# Patient Record
Sex: MALE | Race: BLACK OR AFRICAN AMERICAN | NOT HISPANIC OR LATINO
[De-identification: names, ages, dates, MRNs, and addresses within clinical notes are randomized per-mention and may not be internally consistent; named-entity substitution may affect disease eponyms.]

---

## 2019-05-16 ENCOUNTER — APPOINTMENT (OUTPATIENT)
Dept: PULMONOLOGY | Facility: CLINIC | Age: 34
End: 2019-05-16
Payer: COMMERCIAL

## 2019-05-16 ENCOUNTER — LABORATORY RESULT (OUTPATIENT)
Age: 34
End: 2019-05-16

## 2019-05-16 VITALS
SYSTOLIC BLOOD PRESSURE: 127 MMHG | HEART RATE: 74 BPM | BODY MASS INDEX: 25.83 KG/M2 | TEMPERATURE: 98 F | DIASTOLIC BLOOD PRESSURE: 79 MMHG | HEIGHT: 65 IN | WEIGHT: 155 LBS | OXYGEN SATURATION: 96 % | RESPIRATION RATE: 16 BRPM

## 2019-05-16 DIAGNOSIS — Z83.2 FAMILY HISTORY OF DISEASES OF THE BLOOD AND BLOOD-FORMING ORGANS AND CERTAIN DISORDERS INVOLVING THE IMMUNE MECHANISM: ICD-10-CM

## 2019-05-16 DIAGNOSIS — Z87.891 PERSONAL HISTORY OF NICOTINE DEPENDENCE: ICD-10-CM

## 2019-05-16 PROCEDURE — 94726 PLETHYSMOGRAPHY LUNG VOLUMES: CPT

## 2019-05-16 PROCEDURE — 99245 OFF/OP CONSLTJ NEW/EST HI 55: CPT | Mod: 25

## 2019-05-16 PROCEDURE — 94729 DIFFUSING CAPACITY: CPT

## 2019-05-16 PROCEDURE — 36415 COLL VENOUS BLD VENIPUNCTURE: CPT

## 2019-05-16 PROCEDURE — ZZZZZ: CPT

## 2019-05-16 PROCEDURE — 94060 EVALUATION OF WHEEZING: CPT

## 2019-05-16 RX ORDER — ALBUTEROL SULFATE 90 UG/1
108 (90 BASE) AEROSOL, METERED RESPIRATORY (INHALATION)
Qty: 1 | Refills: 1 | Status: ACTIVE | COMMUNITY
Start: 2019-05-16 | End: 1900-01-01

## 2019-05-16 NOTE — HISTORY OF PRESENT ILLNESS
[FreeTextEntry1] : This letter  is regarding your patient  who  attended pulmonary out patient office today.  I have reviewed  patient's  past history, social history, family history and medication list. I also  reviewed nurse practitioners/ and fellows  notes and assessment and agree with it.  \par The patient was referred by Dr.Aliama Okeefe for sarcoidosis (previously diagnosed on CT-- no biopsy) . PMH Gray's Palsy. Former cigarette smoker approx age 20-21. 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 Pt c/o exertional dyspnea\par \par ------No history of , fever, chills , rigors, chest pain, or hemoptysis. Questionable history of Raynaud's phenomenon. No h/o significant weight loss in last few months. No history of liver dysfunction , collagen vascular disorder or chronic thromboembolic disease. I would classify the patient's dyspnea as WHO  FUNCTIONAL CLASS II--------\par \par ----Echo  date--not done----\par ----Pft date---5/2019 mild obstruction w/minimal bronchodilator response, decreased dlco------\par ----Ct scan date---not done (report normal CXR)----\par ----Cath date------not done------\par \par

## 2019-05-16 NOTE — PHYSICAL EXAM
[General Appearance - Well Developed] : well developed [Normal Appearance] : normal appearance [Well Groomed] : well groomed [General Appearance - Well Nourished] : well nourished [No Deformities] : no deformities [General Appearance - In No Acute Distress] : no acute distress [Normal Conjunctiva] : the conjunctiva exhibited no abnormalities [Eyelids - No Xanthelasma] : the eyelids demonstrated no xanthelasmas [III] : III [Neck Cervical Mass (___cm)] : no neck mass was observed [Neck Appearance] : the appearance of the neck was normal [Jugular Venous Distention Increased] : there was no jugular-venous distention [Thyroid Nodule] : there were no palpable thyroid nodules [Thyroid Diffuse Enlargement] : the thyroid was not enlarged [Heart Rate And Rhythm] : heart rate and rhythm were normal [Murmurs] : no murmurs present [Heart Sounds] : normal S1 and S2 [Auscultation Breath Sounds / Voice Sounds] : lungs were clear to auscultation bilaterally [Exaggerated Use Of Accessory Muscles For Inspiration] : no accessory muscle use [Respiration, Rhythm And Depth] : normal respiratory rhythm and effort [Tenderness: ____] : tenderness [unfilled] [Abdomen Soft] : soft [Abdomen Tenderness] : non-tender [Abdomen Mass (___ Cm)] : no abdominal mass palpated [Abnormal Walk] : normal gait [Nail Clubbing] : no clubbing of the fingernails [Gait - Sufficient For Exercise Testing] : the gait was sufficient for exercise testing [Petechial Hemorrhages (___cm)] : no petechial hemorrhages [Cyanosis, Localized] : no localized cyanosis [Skin Color & Pigmentation] : normal skin color and pigmentation [] : no rash [Skin Turgor] : normal skin turgor [Sensation] : the sensory exam was normal to light touch and pinprick [Deep Tendon Reflexes (DTR)] : deep tendon reflexes were 2+ and symmetric [No Focal Deficits] : no focal deficits [Oriented To Time, Place, And Person] : oriented to person, place, and time [Impaired Insight] : insight and judgment were intact [Affect] : the affect was normal

## 2019-05-16 NOTE — DISCUSSION/SUMMARY
[FreeTextEntry1] : ---Assessment plan----------The patient has been referred here for further opinion regarding pulmonary problem, 34 yo w/sarcoidosis , PAST H/O RETINITIS, ASLO   BELLS  PALSY { ?NEURO SARCOIDOSIS] \par 1) get CT chest noncontrast\par 2) CPET\par 3) labs drawn today for sarcoid w/u\par 4) echo \par 5) ventolin as needed \par 6) has features of WILFREDO- consider PSG (will discuss at next visit)\par 7) yearly eye exam\par 8) reinforced need for yearly PFT\par 9) f/u in 8 weeks\par \par \par Thanks for allowing  me to participate  in the care of this patient.  Patient at this time  will follow  the above mentioned recommendations and return back for follow up visit. If you have any questions  I can be reached  at #[phone] (beeper)  or  [phone] (office).\par \par Lilo Sanchez MD, FCCP \par Director, Pulmonary Hypertension Program \par [facility] \par Division of Pulmonary, Critical Care and Sleep Medicine \par  Professor of Medicine \par [employer]\par

## 2019-05-17 LAB
A1AT SERPL-MCNC: 119 MG/DL
ALBUMIN SERPL ELPH-MCNC: 4.5 G/DL
ALP BLD-CCNC: 73 U/L
ALT SERPL-CCNC: 21 U/L
ANION GAP SERPL CALC-SCNC: 15 MMOL/L
AST SERPL-CCNC: 37 U/L
BASOPHILS # BLD AUTO: 0.03 K/UL
BASOPHILS NFR BLD AUTO: 0.9 %
BILIRUB SERPL-MCNC: 0.8 MG/DL
BUN SERPL-MCNC: 12 MG/DL
CALCIUM SERPL-MCNC: 9.8 MG/DL
CALCIUM SERPL-MCNC: 9.8 MG/DL
CARDIOLIPIN AB SER IA-ACNC: NEGATIVE
CHLORIDE SERPL-SCNC: 101 MMOL/L
CO2 SERPL-SCNC: 23 MMOL/L
CREAT SERPL-MCNC: 0.96 MG/DL
DEPRECATED KAPPA LC FREE/LAMBDA SER: 0.94 RATIO
EOSINOPHIL # BLD AUTO: 0.1 K/UL
EOSINOPHIL NFR BLD AUTO: 3 %
ERYTHROCYTE [SEDIMENTATION RATE] IN BLOOD BY WESTERGREN METHOD: 32 MM/HR
FOLATE RBC-MCNC: 1265 NG/ML
GLUCOSE SERPL-MCNC: 93 MG/DL
HCT VFR BLD CALC: 47.9 %
HCT VFR BLD CALC: 48.2 %
HCYS SERPL-MCNC: 10.6 UMOL/L
HGB BLD-MCNC: 14.9 G/DL
IGA SER QL IEP: 202 MG/DL
IGG SER QL IEP: 2399 MG/DL
IGM SER QL IEP: 85 MG/DL
IMM GRANULOCYTES NFR BLD AUTO: 0.3 %
INR PPP: 1.2 RATIO
KAPPA LC CSF-MCNC: 1.86 MG/DL
KAPPA LC SERPL-MCNC: 1.75 MG/DL
LYMPHOCYTES # BLD AUTO: 0.67 K/UL
LYMPHOCYTES NFR BLD AUTO: 20.4 %
MAN DIFF?: NORMAL
MCHC RBC-ENTMCNC: 25.8 PG
MCHC RBC-ENTMCNC: 30.9 GM/DL
MCV RBC AUTO: 83.4 FL
MONOCYTES # BLD AUTO: 0.53 K/UL
MONOCYTES NFR BLD AUTO: 16.2 %
NEUTROPHILS # BLD AUTO: 1.94 K/UL
NEUTROPHILS NFR BLD AUTO: 59.2 %
NT-PROBNP SERPL-MCNC: 13 PG/ML
PARATHYROID HORMONE INTACT: 35 PG/ML
PHOSPHATE SERPL-MCNC: 4.2 MG/DL
PLATELET # BLD AUTO: 354 K/UL
POTASSIUM SERPL-SCNC: 4.8 MMOL/L
PROT SERPL-MCNC: 8.5 G/DL
PT BLD: 13.8 SEC
RBC # BLD: 5.78 M/UL
RBC # FLD: 13.8 %
RHEUMATOID FACT SER QL: <10 IU/ML
SODIUM SERPL-SCNC: 139 MMOL/L
T3RU NFR SERPL: 1 TBI
TSH SERPL-ACNC: 0.77 UIU/ML
URATE SERPL-MCNC: 6.5 MG/DL
VIT B12 SERPL-MCNC: 611 PG/ML
WBC # FLD AUTO: 3.28 K/UL

## 2019-05-20 ENCOUNTER — MEDICATION RENEWAL (OUTPATIENT)
Age: 34
End: 2019-05-20

## 2019-05-20 LAB
25(OH)D3 SERPL-MCNC: 6.8 NG/ML
ANA PAT FLD IF-IMP: ABNORMAL
ANACR T: ABNORMAL
ENA SCL70 IGG SER IA-ACNC: <0.2 AL
ENA SS-A AB SER IA-ACNC: <0.2 AL
ENA SS-B AB SER IA-ACNC: <0.2 AL
TOTAL IGE SMQN RAST: 6 KU/L

## 2019-05-21 LAB
ACE BLD-CCNC: 174 U/L
CCP AB SER IA-ACNC: 14 UNITS
RF+CCP IGG SER-IMP: NEGATIVE

## 2019-05-29 ENCOUNTER — MOBILE ON CALL (OUTPATIENT)
Age: 34
End: 2019-05-29

## 2019-05-29 LAB
ACETAMINOPHE, BLOOD: NEGATIVE UG/ML
AMPHETAMINES, BLD: NEGATIVE NG/ML
BARBITURATES, BLD: NEGATIVE UG/ML
BENZODIAZEPINES, BLD: NEGATIVE NG/ML
BUPRENORPHINE, BLOOD: NEGATIVE NG/ML
CARISOPRODOL, BLOOD: NEGATIVE UG/ML
COCAINE METABOLITE. BLD: NEGATIVE NG/ML
DRUGS ABSENT REPORTED: NORMAL
DRUGS PRESENT NOT REPORTED: NORMAL
DRUGS PRESENT REPORTED: NORMAL
ETHYL ALCOHOL, BLD: NEGATIVE GM/DL
FENTANYL, BLD: NEGATIVE NG/ML
GABAPENTIN BLOOD: NEGATIVE UG/ML
MEPERIDINE,BLOOD: NEGATIVE NG/ML
METHADONE, BLD: NEGATIVE NG/ML
OPIATES, BLOOD: NEGATIVE NG/ML
OTHER DRUGS DETECTED: NORMAL
OXYCODONE, BLOOD: NEGATIVE NG/ML
PHENCYCLIDINE, BLD: NEGATIVE NG/ML
PROPOXYPHENE, BLD: NEGATIVE NG/ML
REPORTED MEDICATIONS: NORMAL
THC (MARIJUANA) METABOLITE BLD: NEGATIVE NG/ML
TRAMADOL BLOOD: NEGATIVE NG/ML

## 2019-06-06 ENCOUNTER — APPOINTMENT (OUTPATIENT)
Dept: CT IMAGING | Facility: CLINIC | Age: 34
End: 2019-06-06
Payer: COMMERCIAL

## 2019-06-06 ENCOUNTER — OUTPATIENT (OUTPATIENT)
Dept: OUTPATIENT SERVICES | Facility: HOSPITAL | Age: 34
LOS: 1 days | End: 2019-06-06
Payer: COMMERCIAL

## 2019-06-06 DIAGNOSIS — D86.9 SARCOIDOSIS, UNSPECIFIED: ICD-10-CM

## 2019-06-06 DIAGNOSIS — Z00.00 ENCOUNTER FOR GENERAL ADULT MEDICAL EXAMINATION WITHOUT ABNORMAL FINDINGS: ICD-10-CM

## 2019-06-06 PROCEDURE — 71250 CT THORAX DX C-: CPT

## 2019-06-06 PROCEDURE — 71250 CT THORAX DX C-: CPT | Mod: 26

## 2019-06-27 ENCOUNTER — OUTPATIENT (OUTPATIENT)
Dept: OUTPATIENT SERVICES | Facility: HOSPITAL | Age: 34
LOS: 1 days | End: 2019-06-27
Payer: COMMERCIAL

## 2019-06-27 ENCOUNTER — APPOINTMENT (OUTPATIENT)
Dept: CV DIAGNOSITCS | Facility: HOSPITAL | Age: 34
End: 2019-06-27

## 2019-06-27 DIAGNOSIS — I25.10 ATHEROSCLEROTIC HEART DISEASE OF NATIVE CORONARY ARTERY WITHOUT ANGINA PECTORIS: ICD-10-CM

## 2019-06-27 PROCEDURE — 93306 TTE W/DOPPLER COMPLETE: CPT | Mod: 26

## 2019-06-27 PROCEDURE — 93306 TTE W/DOPPLER COMPLETE: CPT

## 2019-07-10 NOTE — PHYSICAL EXAM
[General Appearance - Well Developed] : well developed [Normal Appearance] : normal appearance [Well Groomed] : well groomed [No Deformities] : no deformities [General Appearance - Well Nourished] : well nourished [General Appearance - In No Acute Distress] : no acute distress [Eyelids - No Xanthelasma] : the eyelids demonstrated no xanthelasmas [Normal Conjunctiva] : the conjunctiva exhibited no abnormalities [Neck Cervical Mass (___cm)] : no neck mass was observed [III] : III [Neck Appearance] : the appearance of the neck was normal [Jugular Venous Distention Increased] : there was no jugular-venous distention [Thyroid Nodule] : there were no palpable thyroid nodules [Thyroid Diffuse Enlargement] : the thyroid was not enlarged [Heart Sounds] : normal S1 and S2 [Heart Rate And Rhythm] : heart rate and rhythm were normal [Murmurs] : no murmurs present [Auscultation Breath Sounds / Voice Sounds] : lungs were clear to auscultation bilaterally [Exaggerated Use Of Accessory Muscles For Inspiration] : no accessory muscle use [Respiration, Rhythm And Depth] : normal respiratory rhythm and effort [Tenderness: ____] : tenderness [unfilled] [Abdomen Tenderness] : non-tender [Abdomen Soft] : soft [Abnormal Walk] : normal gait [Abdomen Mass (___ Cm)] : no abdominal mass palpated [Gait - Sufficient For Exercise Testing] : the gait was sufficient for exercise testing [Nail Clubbing] : no clubbing of the fingernails [Petechial Hemorrhages (___cm)] : no petechial hemorrhages [Cyanosis, Localized] : no localized cyanosis [Sensation] : the sensory exam was normal to light touch and pinprick [Deep Tendon Reflexes (DTR)] : deep tendon reflexes were 2+ and symmetric [No Focal Deficits] : no focal deficits [Impaired Insight] : insight and judgment were intact [Oriented To Time, Place, And Person] : oriented to person, place, and time [Skin Color & Pigmentation] : normal skin color and pigmentation [Affect] : the affect was normal [Skin Turgor] : normal skin turgor [] : no rash

## 2019-07-11 ENCOUNTER — APPOINTMENT (OUTPATIENT)
Dept: PULMONOLOGY | Facility: CLINIC | Age: 34
End: 2019-07-11
Payer: COMMERCIAL

## 2019-07-11 VITALS
DIASTOLIC BLOOD PRESSURE: 76 MMHG | HEIGHT: 65 IN | OXYGEN SATURATION: 96 % | WEIGHT: 155 LBS | RESPIRATION RATE: 16 BRPM | HEART RATE: 76 BPM | BODY MASS INDEX: 25.83 KG/M2 | TEMPERATURE: 98.4 F | SYSTOLIC BLOOD PRESSURE: 121 MMHG

## 2019-07-11 DIAGNOSIS — Z86.69 PERSONAL HISTORY OF OTHER DISEASES OF THE NERVOUS SYSTEM AND SENSE ORGANS: ICD-10-CM

## 2019-07-11 PROCEDURE — ZZZZZ: CPT

## 2019-07-11 PROCEDURE — 99215 OFFICE O/P EST HI 40 MIN: CPT | Mod: 25

## 2019-07-11 PROCEDURE — 94375 RESPIRATORY FLOW VOLUME LOOP: CPT

## 2019-07-11 PROCEDURE — 94621 CARDIOPULM EXERCISE TESTING: CPT

## 2019-07-11 NOTE — DISCUSSION/SUMMARY
[FreeTextEntry1] : ---Assessment plan----------The patient has been referred here for further opinion regarding pulmonary problem, 34 yo w/sarcoidosis , PAST H/O RETINITIS, ASLO   BELLS  PALSY   { ?NEURO SARCOIDOSIS] \par 1)  CT chest noncontrast - SUGGESTIVE OF SARCOIDOSIS\par 2RDT O DR QUINONEZ FOR OPEN LUNG BIOPSY \par 3) labs drawn today for sarcoid w/u\par 4) echo \par 5)LOW DOSE PREDNISONE  FOR 8 WEEKS [ PT HESITANT ]\par 6) has features of WILFREDO- consider PSG (will discuss at next visit)\par 7) yearly eye exam\par 8) reinforced need for yearly PFT\par 9) f/u in 8 weeks\par \par \par Thanks for allowing  me to participate  in the care of this patient.  Patient at this time  will follow  the above mentioned recommendations and return back for follow up visit. If you have any questions  I can be reached  at #[phone] (beeper)  or  [phone] (office).\par \par Lilo Sanchez MD, FCCP \par Director, Pulmonary Hypertension Program \par [facility] \par Division of Pulmonary, Critical Care and Sleep Medicine \par  Professor of Medicine \par [employer]\par

## 2019-07-11 NOTE — HISTORY OF PRESENT ILLNESS
[FreeTextEntry1] : This letter  is regarding your patient  who  attended pulmonary out patient office today.  I have reviewed  patient's  past history, social history, family history and medication list. I also  reviewed nurse practitioners/ and fellows  notes and assessment and agree with it.  \par The patient was referred by Dr.Aliama Okeefe for sarcoidosis (previously diagnosed on CT-- no biopsy) . PMH Gray's Palsy. Former cigarette smoker approx age [age]. 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 Pt c/o exertional dyspnea\par \par ------No history of , fever, chills , rigors, chest pain, or hemoptysis. Questionable history of Raynaud's phenomenon. No h/o significant weight loss in last few months. No history of liver dysfunction , collagen vascular disorder or chronic thromboembolic disease. I would classify the patient's dyspnea as WHO  FUNCTIONAL CLASS II--------\par \par ----Echo  date--not done----\par ----Pft date---5/2019 mild obstruction w/minimal bronchodilator response, decreased dlco------\par ----Ct scan date---2019--  AIRWAYS AND LUNGS: The central tracheobronchial tree is patent. Extensive \par bilateral perilymphatic nodules and irregular opacities most extensive in \par both upper lobes with associated upper lobe volume loss. Mild bronchiectasis \par is noted throughout the lungs. \par \par MEDIASTINUM AND PLEURA: Subcarinal lymph node measures 2.5 cm. Other smaller \par mediastinal lymph nodes are noted. The visualized portion of the thyroid \par gland is unremarkable. There is no pleural effusion. There is no \par pneumothorax. \par \par HEART AND VESSELS: The heart is normal in size. There are no \par atherosclerotic calcifications of the aorta. There is no pericardial \par effusion. \par \par UPPER ABDOMEN: Images of the upper abdomen demonstrate no abnormality. \par \par BONES AND SOFT TISSUES: The bones are unremarkable. The soft tissues are \par unremarkable. \par \par TUBES/LINES: None. \par \par IMPRESSION: \par Extensive pulmonary sarcoidosis. \par \par \par ----Cath date------not done------\par \par

## 2019-07-12 ENCOUNTER — APPOINTMENT (OUTPATIENT)
Dept: OPHTHALMOLOGY | Facility: CLINIC | Age: 34
End: 2019-07-12

## 2019-07-23 LAB
ALBUMIN SERPL ELPH-MCNC: 4.6 G/DL
ALP BLD-CCNC: 73 U/L
ALT SERPL-CCNC: 15 U/L
ANION GAP SERPL CALC-SCNC: 11 MMOL/L
AST SERPL-CCNC: 26 U/L
BASOPHILS # BLD AUTO: 0.03 K/UL
BASOPHILS NFR BLD AUTO: 0.9 %
BILIRUB SERPL-MCNC: 1 MG/DL
BUN SERPL-MCNC: 10 MG/DL
CALCIUM SERPL-MCNC: 9.9 MG/DL
CHLORIDE SERPL-SCNC: 100 MMOL/L
CO2 SERPL-SCNC: 28 MMOL/L
CREAT SERPL-MCNC: 1.01 MG/DL
EOSINOPHIL # BLD AUTO: 0.12 K/UL
EOSINOPHIL NFR BLD AUTO: 3.6 %
GLUCOSE SERPL-MCNC: 89 MG/DL
HCT VFR BLD CALC: 48.2 %
HGB BLD-MCNC: 15.4 G/DL
IMM GRANULOCYTES NFR BLD AUTO: 0.3 %
LYMPHOCYTES # BLD AUTO: 0.83 K/UL
LYMPHOCYTES NFR BLD AUTO: 24.8 %
MAN DIFF?: NORMAL
MCHC RBC-ENTMCNC: 25.8 PG
MCHC RBC-ENTMCNC: 32 GM/DL
MCV RBC AUTO: 80.9 FL
MONOCYTES # BLD AUTO: 0.72 K/UL
MONOCYTES NFR BLD AUTO: 21.5 %
NEUTROPHILS # BLD AUTO: 1.64 K/UL
NEUTROPHILS NFR BLD AUTO: 48.9 %
NT-PROBNP SERPL-MCNC: 34 PG/ML
PLATELET # BLD AUTO: 332 K/UL
POTASSIUM SERPL-SCNC: 5.3 MMOL/L
PROT SERPL-MCNC: 8.5 G/DL
RBC # BLD: 5.96 M/UL
RBC # FLD: 14 %
SODIUM SERPL-SCNC: 139 MMOL/L
WBC # FLD AUTO: 3.35 K/UL

## 2019-08-13 ENCOUNTER — APPOINTMENT (OUTPATIENT)
Dept: THORACIC SURGERY | Facility: CLINIC | Age: 34
End: 2019-08-13
Payer: COMMERCIAL

## 2019-08-13 VITALS
WEIGHT: 158 LBS | SYSTOLIC BLOOD PRESSURE: 135 MMHG | RESPIRATION RATE: 17 BRPM | DIASTOLIC BLOOD PRESSURE: 81 MMHG | OXYGEN SATURATION: 99 % | HEART RATE: 84 BPM | HEIGHT: 66 IN | BODY MASS INDEX: 25.39 KG/M2

## 2019-08-13 PROCEDURE — 99205 OFFICE O/P NEW HI 60 MIN: CPT

## 2019-08-14 RX ORDER — PREDNISONE 10 MG/1
10 TABLET ORAL DAILY
Qty: 60 | Refills: 1 | Status: DISCONTINUED | COMMUNITY
Start: 2019-07-11 | End: 2019-08-14

## 2019-08-14 NOTE — PHYSICAL EXAM
[General Appearance - In No Acute Distress] : in no acute distress [General Appearance - Alert] : alert [Hearing Threshold Finger Rub Not Izard] : hearing was normal [Outer Ear] : the ears and nose were normal in appearance [Neck Appearance] : the appearance of the neck was normal [Respiration, Rhythm And Depth] : normal respiratory rhythm and effort [] : no respiratory distress [Heart Sounds] : normal S1 and S2 [Auscultation Breath Sounds / Voice Sounds] : lungs were clear to auscultation bilaterally [Examination Of The Chest] : the chest was normal in appearance [Murmurs] : no murmurs [Abdomen Soft] : soft [Abdomen Tenderness] : non-tender [Cervical Lymph Nodes Enlarged Posterior Bilaterally] : posterior cervical [Cervical Lymph Nodes Enlarged Anterior Bilaterally] : anterior cervical [Supraclavicular Lymph Nodes Enlarged Bilaterally] : supraclavicular [Skin Color & Pigmentation] : normal skin color and pigmentation [Abnormal Walk] : normal gait [Oriented To Time, Place, And Person] : oriented to person, place, and time [Skin Turgor] : normal skin turgor [Mood] : the mood was normal [Affect] : the affect was normal

## 2019-08-15 NOTE — HISTORY OF PRESENT ILLNESS
[FreeTextEntry1] : Mr. Lenz is a 33 year old male who presents today for evaluation of subcarinal lymphadenopathy, referred by Dr. Sanchez.  He is a nonsmoker with a history of marijuana use, Bell's Palsy and sarcoid (diagnosed by CT).  He states that he began feeling winded one year ago which prompted pulmonary evaluation.\par \par CT Chest on 6/6/19 revealed:\par - Subcarinal lymph node, 2.5 cm\par - Extensive bilateral perilymphatic nodules and irregular opacities most extensive in bilateral upper lobes\par - Mild bronchiectasis bilaterally\par \par PFTs on 5/16/19 revealed:\par - FEV1: 2.83 (89%)\par - DLCO: 19.1 (59%)\par \par Currently, he denies any fever, chills, cough, shortness of breath, chest pain, hemoptysis, or recent illness.  Of note, he was recommended to take Prednisone by Dr. Sanchez, however, he declined steroids at this time.

## 2019-08-15 NOTE — CONSULT LETTER
[Please see my note below.] : Please see my note below. [Consult Letter:] : I had the pleasure of evaluating your patient, [unfilled]. [Consult Closing:] : Thank you very much for allowing me to participate in the care of this patient.  If you have any questions, please do not hesitate to contact me. [FreeTextEntry2] : Dr. Sanchez (Pulm/ref)\par Dr. Okeefe (PCP)\par  [FreeTextEntry3] : \par \par \par Lennox Jackson MD, FACS \par Chief, Division of Thoracic Surgery \par Director, Minimally Invasive Thoracic Surgery \par Department of Cardiovascular and Thoracic Surgery \par Health system \par , Cardiovascular and Thoracic Surgery

## 2019-08-16 ENCOUNTER — OUTPATIENT (OUTPATIENT)
Dept: OUTPATIENT SERVICES | Facility: HOSPITAL | Age: 34
LOS: 1 days | End: 2019-08-16

## 2019-08-16 VITALS
SYSTOLIC BLOOD PRESSURE: 108 MMHG | HEART RATE: 71 BPM | RESPIRATION RATE: 16 BRPM | TEMPERATURE: 98 F | DIASTOLIC BLOOD PRESSURE: 72 MMHG | WEIGHT: 156.09 LBS | OXYGEN SATURATION: 99 % | HEIGHT: 65.5 IN

## 2019-08-16 DIAGNOSIS — D86.9 SARCOIDOSIS, UNSPECIFIED: ICD-10-CM

## 2019-08-16 LAB
ANION GAP SERPL CALC-SCNC: 16 MMO/L — HIGH (ref 7–14)
ANISOCYTOSIS BLD QL: SLIGHT — SIGNIFICANT CHANGE UP
ANISOCYTOSIS BLD QL: SLIGHT — SIGNIFICANT CHANGE UP
BASOPHILS # BLD AUTO: 0.06 K/UL — SIGNIFICANT CHANGE UP (ref 0–0.2)
BASOPHILS NFR BLD AUTO: 1.6 % — SIGNIFICANT CHANGE UP (ref 0–2)
BASOPHILS NFR SPEC: 1 % — SIGNIFICANT CHANGE UP (ref 0–2)
BASOPHILS NFR SPEC: 1 % — SIGNIFICANT CHANGE UP (ref 0–2)
BLD GP AB SCN SERPL QL: NEGATIVE — SIGNIFICANT CHANGE UP
BUN SERPL-MCNC: 8 MG/DL — SIGNIFICANT CHANGE UP (ref 7–23)
CALCIUM SERPL-MCNC: 9.4 MG/DL — SIGNIFICANT CHANGE UP (ref 8.4–10.5)
CHLORIDE SERPL-SCNC: 98 MMOL/L — SIGNIFICANT CHANGE UP (ref 98–107)
CO2 SERPL-SCNC: 26 MMOL/L — SIGNIFICANT CHANGE UP (ref 22–31)
CREAT SERPL-MCNC: 0.93 MG/DL — SIGNIFICANT CHANGE UP (ref 0.5–1.3)
EOSINOPHIL # BLD AUTO: 0.16 K/UL — SIGNIFICANT CHANGE UP (ref 0–0.5)
EOSINOPHIL NFR BLD AUTO: 4.3 % — SIGNIFICANT CHANGE UP (ref 0–6)
EOSINOPHIL NFR FLD: 6 % — SIGNIFICANT CHANGE UP (ref 0–6)
EOSINOPHIL NFR FLD: 6 % — SIGNIFICANT CHANGE UP (ref 0–6)
GLUCOSE SERPL-MCNC: 71 MG/DL — SIGNIFICANT CHANGE UP (ref 70–99)
HCT VFR BLD CALC: 45.4 % — SIGNIFICANT CHANGE UP (ref 39–50)
HCT VFR BLD CALC: 45.4 % — SIGNIFICANT CHANGE UP (ref 39–50)
HGB BLD-MCNC: 14.5 G/DL — SIGNIFICANT CHANGE UP (ref 13–17)
HGB BLD-MCNC: 14.5 G/DL — SIGNIFICANT CHANGE UP (ref 13–17)
IMM GRANULOCYTES NFR BLD AUTO: 0.3 % — SIGNIFICANT CHANGE UP (ref 0–1.5)
LYMPHOCYTES # BLD AUTO: 0.59 K/UL — LOW (ref 1–3.3)
LYMPHOCYTES # BLD AUTO: 15.8 % — SIGNIFICANT CHANGE UP (ref 13–44)
LYMPHOCYTES NFR SPEC AUTO: 14 % — SIGNIFICANT CHANGE UP (ref 13–44)
LYMPHOCYTES NFR SPEC AUTO: 14 % — SIGNIFICANT CHANGE UP (ref 13–44)
MANUAL SMEAR VERIFICATION: SIGNIFICANT CHANGE UP
MANUAL SMEAR VERIFICATION: SIGNIFICANT CHANGE UP
MCHC RBC-ENTMCNC: 25.5 PG — LOW (ref 27–34)
MCHC RBC-ENTMCNC: 25.5 PG — LOW (ref 27–34)
MCHC RBC-ENTMCNC: 31.9 % — LOW (ref 32–36)
MCHC RBC-ENTMCNC: 31.9 % — LOW (ref 32–36)
MCV RBC AUTO: 79.9 FL — LOW (ref 80–100)
MCV RBC AUTO: 79.9 FL — LOW (ref 80–100)
MONOCYTES # BLD AUTO: 0.91 K/UL — HIGH (ref 0–0.9)
MONOCYTES NFR BLD AUTO: 24.4 % — HIGH (ref 2–14)
MONOCYTES NFR BLD: 18 % — HIGH (ref 2–9)
MONOCYTES NFR BLD: 18 % — HIGH (ref 2–9)
MYELOCYTES NFR BLD: 1 % — HIGH (ref 0–0)
MYELOCYTES NFR BLD: 1 % — HIGH (ref 0–0)
NEUTROPHIL AB SER-ACNC: 59 % — SIGNIFICANT CHANGE UP (ref 43–77)
NEUTROPHIL AB SER-ACNC: 59 % — SIGNIFICANT CHANGE UP (ref 43–77)
NEUTROPHILS # BLD AUTO: 2 K/UL — SIGNIFICANT CHANGE UP (ref 1.8–7.4)
NEUTROPHILS NFR BLD AUTO: 53.6 % — SIGNIFICANT CHANGE UP (ref 43–77)
NEUTS BAND # BLD: 1 % — SIGNIFICANT CHANGE UP (ref 0–6)
NEUTS BAND # BLD: 1 % — SIGNIFICANT CHANGE UP (ref 0–6)
NRBC # BLD: 0 /100WBC — SIGNIFICANT CHANGE UP
NRBC # BLD: 0 /100WBC — SIGNIFICANT CHANGE UP
NRBC # FLD: 0 K/UL — SIGNIFICANT CHANGE UP (ref 0–0)
NRBC # FLD: 0 K/UL — SIGNIFICANT CHANGE UP (ref 0–0)
PLATELET # BLD AUTO: 345 K/UL — SIGNIFICANT CHANGE UP (ref 150–400)
PLATELET # BLD AUTO: 345 K/UL — SIGNIFICANT CHANGE UP (ref 150–400)
PLATELET COUNT - ESTIMATE: NORMAL — SIGNIFICANT CHANGE UP
PLATELET COUNT - ESTIMATE: NORMAL — SIGNIFICANT CHANGE UP
PMV BLD: 10.3 FL — SIGNIFICANT CHANGE UP (ref 7–13)
PMV BLD: 10.3 FL — SIGNIFICANT CHANGE UP (ref 7–13)
POTASSIUM SERPL-MCNC: 4.2 MMOL/L — SIGNIFICANT CHANGE UP (ref 3.5–5.3)
POTASSIUM SERPL-SCNC: 4.2 MMOL/L — SIGNIFICANT CHANGE UP (ref 3.5–5.3)
RBC # BLD: 5.68 M/UL — SIGNIFICANT CHANGE UP (ref 4.2–5.8)
RBC # BLD: 5.68 M/UL — SIGNIFICANT CHANGE UP (ref 4.2–5.8)
RBC # FLD: 13.3 % — SIGNIFICANT CHANGE UP (ref 10.3–14.5)
RBC # FLD: 13.3 % — SIGNIFICANT CHANGE UP (ref 10.3–14.5)
REVIEW TO FOLLOW: YES — SIGNIFICANT CHANGE UP
REVIEW TO FOLLOW: YES — SIGNIFICANT CHANGE UP
RH IG SCN BLD-IMP: POSITIVE — SIGNIFICANT CHANGE UP
SODIUM SERPL-SCNC: 140 MMOL/L — SIGNIFICANT CHANGE UP (ref 135–145)
WBC # BLD: 3.73 K/UL — LOW (ref 3.8–10.5)
WBC # BLD: 3.73 K/UL — LOW (ref 3.8–10.5)
WBC # FLD AUTO: 3.73 K/UL — LOW (ref 3.8–10.5)
WBC # FLD AUTO: 3.73 K/UL — LOW (ref 3.8–10.5)

## 2019-08-16 RX ORDER — SODIUM CHLORIDE 9 MG/ML
1000 INJECTION, SOLUTION INTRAVENOUS
Refills: 0 | Status: DISCONTINUED | OUTPATIENT
Start: 2019-08-21 | End: 2019-09-11

## 2019-08-16 NOTE — H&P PST ADULT - HISTORY OF PRESENT ILLNESS
32y/o male presents for preop eval for scheduled for flexible bronchoscopy, endobronchial ultrasound possible mediastinoscopy with cytology on 8/21/2019.  Per pt dx few years ago.  Had recent episode of increasing SOB.

## 2019-08-16 NOTE — H&P PST ADULT - NSICDXPROBLEM_GEN_ALL_CORE_FT
Sarcoidosis, unspecified:  Scheduled for flexible bronchoscopy, endobronchial ultrasound possible mediastinoscopy with cytology Sarcoidosis, unspecified:  Scheduled for flexible bronchoscopy, endobronchial ultrasound possible mediastinoscopy with cytology  written and verbal preop instructions, surgical soap & Gi prophylaxis given  pt verbalized good understanding, teach back method use for surgical soap.  continue medication per routine schedule  copy of recent echo & PFT in chart  abo on admit

## 2019-08-16 NOTE — H&P PST ADULT - CENTRAL VENOUS CATHETER
current antibiotics should be appropriate pending micro data and clarification of any urinary symptoms as patient seems to be returning to baseline mentation
as above
no

## 2019-08-16 NOTE — H&P PST ADULT - NEGATIVE GASTROINTESTINAL SYMPTOMS
no abdominal pain/no melena/no diarrhea/no constipation/no nausea/no vomiting/no change in bowel habits

## 2019-08-16 NOTE — H&P PST ADULT - NEGATIVE CARDIOVASCULAR SYMPTOMS
no orthopnea/no dyspnea on exertion/no paroxysmal nocturnal dyspnea/no palpitations/no claudication/no peripheral edema/no chest pain

## 2019-08-19 PROBLEM — G51.0 BELL'S PALSY: Chronic | Status: ACTIVE | Noted: 2019-08-16

## 2019-08-19 PROBLEM — D86.9 SARCOIDOSIS, UNSPECIFIED: Chronic | Status: ACTIVE | Noted: 2019-08-16

## 2019-08-21 ENCOUNTER — RESULT REVIEW (OUTPATIENT)
Age: 34
End: 2019-08-21

## 2019-08-21 ENCOUNTER — APPOINTMENT (OUTPATIENT)
Dept: THORACIC SURGERY | Facility: HOSPITAL | Age: 34
End: 2019-08-21

## 2019-08-21 ENCOUNTER — OUTPATIENT (OUTPATIENT)
Dept: OUTPATIENT SERVICES | Facility: HOSPITAL | Age: 34
LOS: 1 days | Discharge: HOME | End: 2019-08-21
Payer: COMMERCIAL

## 2019-08-21 VITALS
OXYGEN SATURATION: 97 % | RESPIRATION RATE: 16 BRPM | HEIGHT: 65.5 IN | WEIGHT: 156.09 LBS | DIASTOLIC BLOOD PRESSURE: 88 MMHG | HEART RATE: 73 BPM | SYSTOLIC BLOOD PRESSURE: 115 MMHG | TEMPERATURE: 98 F

## 2019-08-21 VITALS
RESPIRATION RATE: 20 BRPM | TEMPERATURE: 98 F | OXYGEN SATURATION: 99 % | HEART RATE: 78 BPM | SYSTOLIC BLOOD PRESSURE: 128 MMHG | DIASTOLIC BLOOD PRESSURE: 73 MMHG

## 2019-08-21 DIAGNOSIS — D86.9 SARCOIDOSIS, UNSPECIFIED: ICD-10-CM

## 2019-08-21 DIAGNOSIS — Z98.890 OTHER SPECIFIED POSTPROCEDURAL STATES: Chronic | ICD-10-CM

## 2019-08-21 LAB — RH IG SCN BLD-IMP: POSITIVE — SIGNIFICANT CHANGE UP

## 2019-08-21 PROCEDURE — 88305 TISSUE EXAM BY PATHOLOGIST: CPT | Mod: 26

## 2019-08-21 PROCEDURE — 31653 BRONCH EBUS SAMPLNG 3/> NODE: CPT

## 2019-08-21 PROCEDURE — 88312 SPECIAL STAINS GROUP 1: CPT | Mod: 26

## 2019-08-21 PROCEDURE — 88173 CYTOPATH EVAL FNA REPORT: CPT | Mod: 26

## 2019-08-21 PROCEDURE — 88172 CYTP DX EVAL FNA 1ST EA SITE: CPT | Mod: 26

## 2019-08-21 RX ORDER — FENTANYL CITRATE 50 UG/ML
25 INJECTION INTRAVENOUS
Refills: 0 | Status: DISCONTINUED | OUTPATIENT
Start: 2019-08-21 | End: 2019-08-21

## 2019-08-21 RX ORDER — ONDANSETRON 8 MG/1
4 TABLET, FILM COATED ORAL ONCE
Refills: 0 | Status: DISCONTINUED | OUTPATIENT
Start: 2019-08-21 | End: 2019-09-11

## 2019-08-21 RX ORDER — PREDNISOLONE 5 MG
1 TABLET ORAL
Qty: 0 | Refills: 0 | DISCHARGE

## 2019-08-21 RX ORDER — HYDROMORPHONE HYDROCHLORIDE 2 MG/ML
0.5 INJECTION INTRAMUSCULAR; INTRAVENOUS; SUBCUTANEOUS
Refills: 0 | Status: DISCONTINUED | OUTPATIENT
Start: 2019-08-21 | End: 2019-08-21

## 2019-08-21 RX ORDER — ALBUTEROL 90 UG/1
0 AEROSOL, METERED ORAL
Qty: 0 | Refills: 0 | DISCHARGE

## 2019-08-21 RX ADMIN — SODIUM CHLORIDE 30 MILLILITER(S): 9 INJECTION, SOLUTION INTRAVENOUS at 09:15

## 2019-08-21 NOTE — BRIEF OPERATIVE NOTE - NSICDXBRIEFPROCEDURE_GEN_ALL_CORE_FT
PROCEDURES:  Bronchoscopy, with biopsy of 1 or 2 lymph node stations with EBUS guidance 21-Aug-2019 09:16:42  Enoc Meyers

## 2019-08-21 NOTE — ASU DISCHARGE PLAN (ADULT/PEDIATRIC) - COMMENTS
Make an appointment to see Dr Sanchez in 7 to 10 days.  No need to see Dr Jackson in his office.  If you have any questions or concerns, please call at anytime.

## 2019-08-21 NOTE — ASU DISCHARGE PLAN (ADULT/PEDIATRIC) - NURSING INSTRUCTIONS
Increase to regular diet slowly. Notify MD or return to ER if experiencing shortness of breathe, difficulty swallowing temperatures greater than 101. Call MD for follow up appointment 7-10 days.

## 2019-08-21 NOTE — ASU DISCHARGE PLAN (ADULT/PEDIATRIC) - CARE PROVIDER_API CALL
Lilo Sanchez)  Critical Care Medicine; Internal Medicine; Pulmonary Disease; Sleep Medicine  [address], [address]  [place] [address]  Phone: [phone]  Fax: [phone]  Follow Up Time:

## 2019-08-23 LAB — NON-GYNECOLOGICAL CYTOLOGY STUDY: SIGNIFICANT CHANGE UP

## 2019-08-27 ENCOUNTER — LABORATORY RESULT (OUTPATIENT)
Age: 34
End: 2019-08-27

## 2019-08-27 ENCOUNTER — APPOINTMENT (OUTPATIENT)
Dept: PULMONOLOGY | Facility: CLINIC | Age: 34
End: 2019-08-27
Payer: COMMERCIAL

## 2019-08-27 VITALS
TEMPERATURE: 98.1 F | RESPIRATION RATE: 15 BRPM | HEART RATE: 75 BPM | SYSTOLIC BLOOD PRESSURE: 113 MMHG | WEIGHT: 158 LBS | DIASTOLIC BLOOD PRESSURE: 71 MMHG | BODY MASS INDEX: 25.39 KG/M2 | HEIGHT: 66 IN | OXYGEN SATURATION: 97 %

## 2019-08-27 DIAGNOSIS — R59.1 GENERALIZED ENLARGED LYMPH NODES: ICD-10-CM

## 2019-08-27 PROCEDURE — 36415 COLL VENOUS BLD VENIPUNCTURE: CPT

## 2019-08-27 PROCEDURE — 99215 OFFICE O/P EST HI 40 MIN: CPT | Mod: 25

## 2019-08-27 NOTE — PHYSICAL EXAM
[General Appearance - Well Developed] : well developed [Normal Appearance] : normal appearance [Well Groomed] : well groomed [General Appearance - Well Nourished] : well nourished [No Deformities] : no deformities [General Appearance - In No Acute Distress] : no acute distress [Normal Conjunctiva] : the conjunctiva exhibited no abnormalities [Eyelids - No Xanthelasma] : the eyelids demonstrated no xanthelasmas [III] : III [Neck Appearance] : the appearance of the neck was normal [Neck Cervical Mass (___cm)] : no neck mass was observed [Jugular Venous Distention Increased] : there was no jugular-venous distention [Thyroid Diffuse Enlargement] : the thyroid was not enlarged [Thyroid Nodule] : there were no palpable thyroid nodules [Heart Rate And Rhythm] : heart rate and rhythm were normal [Heart Sounds] : normal S1 and S2 [Murmurs] : no murmurs present [Respiration, Rhythm And Depth] : normal respiratory rhythm and effort [Exaggerated Use Of Accessory Muscles For Inspiration] : no accessory muscle use [Auscultation Breath Sounds / Voice Sounds] : lungs were clear to auscultation bilaterally [Tenderness: ____] : tenderness [unfilled] [Abdomen Soft] : soft [Abdomen Tenderness] : non-tender [Abdomen Mass (___ Cm)] : no abdominal mass palpated [Abnormal Walk] : normal gait [Gait - Sufficient For Exercise Testing] : the gait was sufficient for exercise testing [Nail Clubbing] : no clubbing of the fingernails [Cyanosis, Localized] : no localized cyanosis [Petechial Hemorrhages (___cm)] : no petechial hemorrhages [Deep Tendon Reflexes (DTR)] : deep tendon reflexes were 2+ and symmetric [Sensation] : the sensory exam was normal to light touch and pinprick [No Focal Deficits] : no focal deficits [Oriented To Time, Place, And Person] : oriented to person, place, and time [Impaired Insight] : insight and judgment were intact [Affect] : the affect was normal [Skin Color & Pigmentation] : normal skin color and pigmentation [] : no rash [Skin Turgor] : normal skin turgor

## 2019-08-27 NOTE — HISTORY OF PRESENT ILLNESS
[FreeTextEntry1] : This letter  is regarding your patient  who  attended pulmonary out patient office today.  I have reviewed  patient's  past history, social history, family history and medication list. I also  reviewed nurse practitioners/ and fellows  notes and assessment and agree with it.  \par The patient was referred by Dr.Aliama Okeefe for sarcoidosis (previously diagnosed on CT-- no biopsy) . PMH Gray's Palsy. Former cigarette smoker approx age [age]. 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 Pt c/o exertional dyspnea\par \par ------No history of , fever, chills , rigors, chest pain, or hemoptysis. Questionable history of Raynaud's phenomenon. No h/o significant weight loss in last few months. No history of liver dysfunction , collagen vascular disorder or chronic thromboembolic disease. I would classify the patient's dyspnea as WHO  FUNCTIONAL CLASS II--------\par \par ----Echo  date--not done----\par ----Pft date---5/2019 mild obstruction w/minimal bronchodilator response, decreased dlco------\par ----Ct scan date---2019--  AIRWAYS AND LUNGS: The central tracheobronchial tree is patent. Extensive \par bilateral perilymphatic nodules and irregular opacities most extensive in \par both upper lobes with associated upper lobe volume loss. Mild bronchiectasis \par is noted throughout the lungs. \par \par MEDIASTINUM AND PLEURA: Subcarinal lymph node measures 2.5 cm. Other smaller \par mediastinal lymph nodes are noted. The visualized portion of the thyroid \par gland is unremarkable. There is no pleural effusion. There is no \par pneumothorax. \par \par HEART AND VESSELS: The heart is normal in size. There are no \par atherosclerotic calcifications of the aorta. There is no pericardial \par effusion. \par \par UPPER ABDOMEN: Images of the upper abdomen demonstrate no abnormality. \par \par BONES AND SOFT TISSUES: The bones are unremarkable. The soft tissues are \par unremarkable. \par \par TUBES/LINES: None. \par \par IMPRESSION: \par Extensive pulmonary sarcoidosis. \par \par \par Cytopathology \par \par Addendum\par AFB and GMS stains are negative for Acid Fast Bacilli and Fungal\par Forms micro organisms.\par \par \par Cytopathology Report ACCESSION No:  [id number]\par \par Final Diagnosis\par LYMPH NODE, LEVEL 7, EBUS GUIDED FNA\par SIGNIFICANT FINDINGS.\par Non-necrotizing granulomatous inflammation.\par \par \par aug2019 s/p bronch w/Dr Jackson c/w sarcoidosis - on prednisone 10mg x few weeks- feels that it has helped with his breathing. 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

## 2019-08-27 NOTE — DISCUSSION/SUMMARY
[FreeTextEntry1] : ---Assessment plan----------The patient has been referred here for further opinion regarding pulmonary problem, 32 yo w/sarcoidosis , PAST H/O RETINITIS, ASLO   BELLS  PALSY      { ?NEURO SARCOIDOSIS] \par 1) bronch c/w  sarcoid \par 2) continue prednisone 10mg\par 3) repeat ct chest 10/2019 and pft after being on prednisone\par 4) will get cardiac MRI to r/o cardiac sarcoidosis\par 5) yearly eye exam\par 6) get skin survey\par 7) f/u in 10/2019 \par 8) consider transition to MTX\par 9) strongly advised to wear mask at work [employer] when exposed to dust\par 10) labs drawn today\par \par Thanks for allowing  me to participate  in the care of this patient.  Patient at this time  will follow  the above mentioned recommendations and return back for follow up visit. If you have any questions  I can be reached  at #[phone] (beeper)  or  [phone] (office).\par \par Lilo Sanchez MD, FCCP \par Director, Pulmonary Hypertension Program \par [facility] \par Division of Pulmonary, Critical Care and Sleep Medicine \par  Professor of Medicine \par [employer]\par

## 2019-08-28 ENCOUNTER — MEDICATION RENEWAL (OUTPATIENT)
Age: 34
End: 2019-08-28

## 2019-08-28 LAB
25(OH)D3 SERPL-MCNC: 16.7 NG/ML
ALBUMIN SERPL ELPH-MCNC: 4.8 G/DL
ALP BLD-CCNC: 74 U/L
ALT SERPL-CCNC: 30 U/L
ANION GAP SERPL CALC-SCNC: 12 MMOL/L
AST SERPL-CCNC: 22 U/L
BASOPHILS # BLD AUTO: 0.03 K/UL
BASOPHILS NFR BLD AUTO: 0.7 %
BILIRUB SERPL-MCNC: 0.7 MG/DL
BUN SERPL-MCNC: 8 MG/DL
CALCIUM SERPL-MCNC: 10 MG/DL
CHLORIDE SERPL-SCNC: 98 MMOL/L
CO2 SERPL-SCNC: 28 MMOL/L
CREAT SERPL-MCNC: 0.87 MG/DL
EOSINOPHIL # BLD AUTO: 0.03 K/UL
EOSINOPHIL NFR BLD AUTO: 0.7 %
GLUCOSE SERPL-MCNC: 100 MG/DL
HCT VFR BLD CALC: 48.8 %
HGB BLD-MCNC: 15.3 G/DL
IMM GRANULOCYTES NFR BLD AUTO: 0.7 %
LYMPHOCYTES # BLD AUTO: 0.47 K/UL
LYMPHOCYTES NFR BLD AUTO: 11.2 %
MAN DIFF?: NORMAL
MCHC RBC-ENTMCNC: 25.3 PG
MCHC RBC-ENTMCNC: 31.4 GM/DL
MCV RBC AUTO: 80.7 FL
MONOCYTES # BLD AUTO: 0.52 K/UL
MONOCYTES NFR BLD AUTO: 12.4 %
NEUTROPHILS # BLD AUTO: 3.12 K/UL
NEUTROPHILS NFR BLD AUTO: 74.3 %
PLATELET # BLD AUTO: 375 K/UL
POTASSIUM SERPL-SCNC: 5.4 MMOL/L
PROT SERPL-MCNC: 8.6 G/DL
RBC # BLD: 6.05 M/UL
RBC # FLD: 13.6 %
SODIUM SERPL-SCNC: 138 MMOL/L
WBC # FLD AUTO: 4.2 K/UL

## 2019-09-17 ENCOUNTER — APPOINTMENT (OUTPATIENT)
Dept: MRI IMAGING | Facility: CLINIC | Age: 34
End: 2019-09-17

## 2019-10-15 ENCOUNTER — APPOINTMENT (OUTPATIENT)
Dept: PULMONOLOGY | Facility: CLINIC | Age: 34
End: 2019-10-15

## 2019-10-16 ENCOUNTER — OUTPATIENT (OUTPATIENT)
Dept: OUTPATIENT SERVICES | Facility: HOSPITAL | Age: 34
LOS: 1 days | End: 2019-10-16
Payer: COMMERCIAL

## 2019-10-16 ENCOUNTER — APPOINTMENT (OUTPATIENT)
Dept: MRI IMAGING | Facility: CLINIC | Age: 34
End: 2019-10-16
Payer: COMMERCIAL

## 2019-10-16 ENCOUNTER — APPOINTMENT (OUTPATIENT)
Dept: CT IMAGING | Facility: CLINIC | Age: 34
End: 2019-10-16
Payer: COMMERCIAL

## 2019-10-16 DIAGNOSIS — Z98.890 OTHER SPECIFIED POSTPROCEDURAL STATES: Chronic | ICD-10-CM

## 2019-10-16 DIAGNOSIS — Z00.8 ENCOUNTER FOR OTHER GENERAL EXAMINATION: ICD-10-CM

## 2019-10-16 PROCEDURE — 71250 CT THORAX DX C-: CPT | Mod: 26

## 2019-10-16 PROCEDURE — 75561 CARDIAC MRI FOR MORPH W/DYE: CPT | Mod: 26

## 2019-10-16 PROCEDURE — 71250 CT THORAX DX C-: CPT

## 2019-10-16 PROCEDURE — 75561 CARDIAC MRI FOR MORPH W/DYE: CPT

## 2019-10-16 PROCEDURE — A9585: CPT

## 2019-10-30 NOTE — PHYSICAL EXAM
[General Appearance - Well Developed] : well developed [Normal Appearance] : normal appearance [Well Groomed] : well groomed [General Appearance - Well Nourished] : well nourished [No Deformities] : no deformities [General Appearance - In No Acute Distress] : no acute distress [Normal Conjunctiva] : the conjunctiva exhibited no abnormalities [Eyelids - No Xanthelasma] : the eyelids demonstrated no xanthelasmas [III] : III [Neck Appearance] : the appearance of the neck was normal [Neck Cervical Mass (___cm)] : no neck mass was observed [Jugular Venous Distention Increased] : there was no jugular-venous distention [Thyroid Diffuse Enlargement] : the thyroid was not enlarged [Thyroid Nodule] : there were no palpable thyroid nodules [Heart Rate And Rhythm] : heart rate and rhythm were normal [Heart Sounds] : normal S1 and S2 [Murmurs] : no murmurs present [Respiration, Rhythm And Depth] : normal respiratory rhythm and effort [Exaggerated Use Of Accessory Muscles For Inspiration] : no accessory muscle use [Auscultation Breath Sounds / Voice Sounds] : lungs were clear to auscultation bilaterally [Tenderness: ____] : tenderness [unfilled] [Abdomen Soft] : soft [Abdomen Tenderness] : non-tender [Abdomen Mass (___ Cm)] : no abdominal mass palpated [Abnormal Walk] : normal gait [Gait - Sufficient For Exercise Testing] : the gait was sufficient for exercise testing [Nail Clubbing] : no clubbing of the fingernails [Cyanosis, Localized] : no localized cyanosis [Petechial Hemorrhages (___cm)] : no petechial hemorrhages [Deep Tendon Reflexes (DTR)] : deep tendon reflexes were 2+ and symmetric [Sensation] : the sensory exam was normal to light touch and pinprick [No Focal Deficits] : no focal deficits [Oriented To Time, Place, And Person] : oriented to person, place, and time [Impaired Insight] : insight and judgment were intact [Affect] : the affect was normal [Skin Color & Pigmentation] : normal skin color and pigmentation [Skin Turgor] : normal skin turgor [] : no rash

## 2019-10-31 ENCOUNTER — APPOINTMENT (OUTPATIENT)
Dept: PULMONOLOGY | Facility: CLINIC | Age: 34
End: 2019-10-31
Payer: COMMERCIAL

## 2019-10-31 VITALS
HEIGHT: 66 IN | OXYGEN SATURATION: 97 % | SYSTOLIC BLOOD PRESSURE: 109 MMHG | TEMPERATURE: 97.7 F | WEIGHT: 163 LBS | BODY MASS INDEX: 26.2 KG/M2 | HEART RATE: 78 BPM | DIASTOLIC BLOOD PRESSURE: 76 MMHG | RESPIRATION RATE: 16 BRPM

## 2019-10-31 PROCEDURE — 99215 OFFICE O/P EST HI 40 MIN: CPT

## 2019-10-31 NOTE — DISCUSSION/SUMMARY
[FreeTextEntry1] : ---Assessment plan----------The patient has been referred here for further opinion regarding pulmonary problem, 34 yo w/sarcoidosis , PAST H/O RETINITIS, ASLO   BELLS  PALSY        { ?NEURO SARCOIDOSIS] \par 1) bronch c/w  sarcoid  [2019] \par 2) off   prednisone \par 3) repeat ct chest 10/2019  IMPROVED and  REPEAT pft APRIL 2019\par 4) will get cardiac MRI to r/o cardiac sarcoidosis , ?PET SCAN TO EVALUATE SARCOIDOSIS \par 5) yearly eye exam\par 6) get skin survey\par 7) f/u in 10/2019 \par 8) DOES NOT WANT TO TAKE ANY MEDICINE FOR SARCOIDOSSI FOR NOW --WILL OBSERVE OFF MEDS--- c\par 9) strongly advised to wear mask at work [employer] when exposed to dust\par 10) labs drawn today\par \par Thanks for allowing  me to participate  in the care of this patient.  Patient at this time  will follow  the above mentioned recommendations and return back for follow up visit. If you have any questions  I can be reached  at #[phone] (beeper)  or  [phone] (office).\par \par Lilo Sanchez MD, FCCP \par Director, Pulmonary Hypertension Program \par [facility] \par Division of Pulmonary, Critical Care and Sleep Medicine \par  Professor of Medicine \par [employer]\par

## 2019-10-31 NOTE — CONSULT LETTER
[DrWilton  ___] : Dr. COSTA [Dear  ___] : Dear  [unfilled], [Courtesy Letter:] : I had the pleasure of seeing your patient, [unfilled], in my office today.

## 2019-10-31 NOTE — HISTORY OF PRESENT ILLNESS
[FreeTextEntry1] : This letter  is regarding your patient  who  attended pulmonary out patient office today.  I have reviewed  patient's  past history, social history, family history and medication list. I also  reviewed nurse practitioners/ and fellows  notes and assessment and agree with it.  \par The patient was referred by Dr.Aliama Okeefe for sarcoidosis (previously diagnosed on CT-- no biopsy) . PMH Gray's Palsy. Former cigarette smoker approx age [age]. 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 Pt c/o exertional dyspnea\par \par ------No history of , fever, chills , rigors, chest pain, or hemoptysis. Questionable history of Raynaud's phenomenon. No h/o significant weight loss in last few months. No history of liver dysfunction , collagen vascular disorder or chronic thromboembolic disease. I would classify the patient's dyspnea as WHO  FUNCTIONAL CLASS II--------\par \par ----Echo  date--not done----\par --CARDIAC MRI-   OCT 2019---NO EVIDENCE OF CARDIAC  SARCOIDOSIS \par ----Pft date---5/2019 mild obstruction w/minimal bronchodilator response, decreased dlco------\par ----Ct scan date---2019--  AIRWAYS AND LUNGS: The central tracheobronchial tree is patent. Extensive \par bilateral perilymphatic nodules and irregular opacities most extensive in \par both upper lobes with associated upper lobe volume loss. Mild bronchiectasis \par is noted throughout the lungs. \par \par MEDIASTINUM AND PLEURA: Subcarinal lymph node measures 2.5 cm. Other smaller \par mediastinal lymph nodes are noted. The visualized portion of the thyroid \par gland is unremarkable. There is no pleural effusion. There is no \par pneumothorax. \par \par HEART AND VESSELS: The heart is normal in size. There are no \par atherosclerotic calcifications of the aorta. There is no pericardial \par effusion. \par \par UPPER ABDOMEN: Images of the upper abdomen demonstrate no abnormality. \par \par BONES AND SOFT TISSUES: The bones are unremarkable. The soft tissues are \par unremarkable. \par \par TUBES/LINES: None. \par \par IMPRESSION: \par Extensive pulmonary sarcoidosis. \par \par CT SCAN OCT 2019 \par IMPRESSION: \par \par Mild improvement in bilateral upper lobe irregular opacities. Otherwise no \par significant change in bilateral perilymphatic nodules and distribution of \par upper lobe predominant opacities along with mild bronchiectasis compatible \par with history of pulmonary sarcoidosis. No honeycombing. \par \par Interval decrease in size of subcarinal lymph node now measuring 1.9 x 1.7 \par cm, previously 3.1 x 2.6 cm. Otherwise no significant change in \par subcentimeter mediastinal lymph nodes. \par \par  \par ct chest 10/2019 IMPRESSION: \par Since 6/6/2019, \par Mild improvement in bilateral upper lobe irregular opacities. Otherwise no \par significant change in bilateral perilymphatic nodules and distribution of \par upper lobe predominant opacities along with mild bronchiectasis compatible \par with history of pulmonary sarcoidosis. No honeycombing. \par \par Interval decrease in size of subcarinal lymph node now measuring 1.9 x 1.7 \par cm, previously 3.1 x 2.6 cm. Otherwise no significant change in \par subcentimeter mediastinal lymph nodes. \par \par BRONCHOSCOPYCytopathology \par \par Addendum\par AFB and GMS stains are negative for Acid Fast Bacilli and Fungal\par Forms micro organisms.\par \par \par Cytopathology Report ACCESSION No:  [id number]\par \par Final Diagnosis\par LYMPH NODE, LEVEL 7, EBUS GUIDED FNA\par SIGNIFICANT FINDINGS.\par Non-necrotizing granulomatous inflammation.\par \par \par OCTOBER 2019 s/p bronch w/Dr Jackson c/w sarcoidosis - on prednisone 10mg x few weeks- feels that it has helped with his breathing. 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

## 2020-04-13 ENCOUNTER — APPOINTMENT (OUTPATIENT)
Dept: PULMONOLOGY | Facility: CLINIC | Age: 35
End: 2020-04-13

## 2020-04-15 ENCOUNTER — TRANSCRIPTION ENCOUNTER (OUTPATIENT)
Age: 35
End: 2020-04-15

## 2020-05-05 ENCOUNTER — NON-APPOINTMENT (OUTPATIENT)
Age: 35
End: 2020-05-05

## 2020-05-06 ENCOUNTER — TRANSCRIPTION ENCOUNTER (OUTPATIENT)
Age: 35
End: 2020-05-06

## 2020-11-12 NOTE — ASU PREOP CHECKLIST - SKIN PREP
Subjective   Patient ID: Mirna is a 27 year old female.    Chief Complaint   Patient presents with   • New Patient     getting a pap next wk @ [facility]      27 year old female with history of hypothyroidism (possible Hashimoto's) presents for abnormal labs collected at an outside fertility center and for endocrinology referral. Pt reports has been trying to conceive for > 1 year now. 's sperm was tested and is viable. 's PCP directed them to fertility center where pt had blood tests and found TSH 11.37 and TPO antibodies of 198.90. Pt complains of no symptoms. Pt denies fatigue, cold intolerance, constipation, dry skin, irregular menses.     Pt reports normal, regular menstrual bleeding. Reports 6 days of bleeding with about 2-3 pads/day. No blood clots, no pain.     Pt denies chest pain, shortness of breath, abdominal pain, n/v/d, fever/chills. Pt reports has had trouble losing weight. Pt reports has been heaviest she's ever been this summer. Pt trying to lose weight and continue healthy diet and exercise.     Labs collected at fertility center:   A1c 5.3  Estradiol 23.55 pg/mL   hydroxy Vit D 19.32    Rubella IgG 99.70   Prolactin 1595   Progesterone 0.61    LH 3.30   FSH 5.21     Mirna has a past medical history of Hashimoto's thyroiditis.  Mirna has Healthcare maintenance and Chronic lymphocytic thyroiditis on their problem list.  Mirna has no past surgical history on file.  Her family history includes Diabetes in her maternal grandmother.  Mirna reports that she has never smoked. She has never used smokeless tobacco. She reports current alcohol use.  Mirna has a current medication list which includes the following prescription(s): levothyroxine.  Mirna   Current Outpatient Medications   Medication Sig Dispense Refill   • levothyroxine 50 MCG tablet        No current facility-administered medications for this visit.      Mirna has No Known Allergies.    Review of Systems   Constitutional:  Negative for chills and fever.   HENT: Negative for congestion and sore throat.    Eyes: Negative for discharge and itching.   Respiratory: Negative for apnea, cough, choking, chest tightness, shortness of breath and wheezing.    Cardiovascular: Negative for chest pain and palpitations.   Gastrointestinal: Negative for abdominal distention, abdominal pain, nausea and vomiting.   Endocrine: Negative for cold intolerance.   Genitourinary: Negative for difficulty urinating, dysuria, enuresis and flank pain.   Musculoskeletal: Negative for arthralgias, back pain and gait problem.   Skin: Negative for color change.   Neurological: Negative for dizziness, facial asymmetry, light-headedness, numbness and headaches.   Psychiatric/Behavioral: Negative for agitation and behavioral problems.     Objective   Physical Exam  Constitutional:       General: She is not in acute distress.     Appearance: Normal appearance. She is normal weight. She is not ill-appearing, toxic-appearing or diaphoretic.   HENT:      Head: Normocephalic and atraumatic.      Nose: Nose normal.      Mouth/Throat:      Mouth: Mucous membranes are moist.      Pharynx: Oropharynx is clear.   Eyes:      Extraocular Movements: Extraocular movements intact.      Pupils: Pupils are equal, round, and reactive to light.   Neck:      Musculoskeletal: Normal range of motion and neck supple.      Comments: Thyroid is slightly enlarged and hard to the touch. Not tender to palpation. No palpable nodules or areas of inflammation.   Cardiovascular:      Rate and Rhythm: Normal rate and regular rhythm.      Pulses: Normal pulses.      Heart sounds: Normal heart sounds. No murmur.   Pulmonary:      Effort: Pulmonary effort is normal. No respiratory distress.      Breath sounds: Normal breath sounds.   Abdominal:      General: Abdomen is flat. Bowel sounds are normal. There is no distension.      Palpations: Abdomen is soft. There is no mass.      Tenderness: There is no  abdominal tenderness.   Musculoskeletal: Normal range of motion.         General: No swelling or tenderness.   Skin:     General: Skin is warm.      Capillary Refill: Capillary refill takes less than 2 seconds.   Neurological:      General: No focal deficit present.      Mental Status: She is alert and oriented to person, place, and time. Mental status is at baseline.      Cranial Nerves: No cranial nerve deficit.      Sensory: No sensory deficit.   Psychiatric:         Mood and Affect: Mood normal.         Behavior: Behavior normal.         Thought Content: Thought content normal.         Judgment: Judgment normal.       Assessment   Problem List Items Addressed This Visit        Endocrine    Chronic lymphocytic thyroiditis     Continue 50 mcg levothyroxine daily     Informed patient to repeat blood work in 6 weeks.  Will collect T3, T4, TSH and TPO antibodies in 6 weeks.          Relevant Orders    THYROID STIMULATING HORMONE    T3, TOTAL    THYROXINE    MICROSOMAL ANTIBODY    SERVICE TO ENDOCRINOLOGY       Other    Healthcare maintenance - Primary     Flu shot today   Tdap (Boostrix) today   Pt will follow with OBGYN next week for pap smear for cervical cancer screening     Encouraged pt to continue healthy diet and exercise.          Relevant Orders    LIPID PANEL WITH REFLEX    TETANUS DIPHTHERIA ACELLULAR PERTUSSIS VACC, 10+ YRS (BOOSTRIX)    CBC WITH DIFFERENTIAL    COMPREHENSIVE METABOLIC PANEL        Rossi Newell MD  PGY-1 Internal Medicine Resident   Please perfect serve to reach.  [phone]     done

## 2020-12-29 ENCOUNTER — TRANSCRIPTION ENCOUNTER (OUTPATIENT)
Age: 35
End: 2020-12-29

## 2021-01-14 ENCOUNTER — TRANSCRIPTION ENCOUNTER (OUTPATIENT)
Age: 36
End: 2021-01-14

## 2021-01-15 ENCOUNTER — TRANSCRIPTION ENCOUNTER (OUTPATIENT)
Age: 36
End: 2021-01-15

## 2021-06-05 DIAGNOSIS — Z01.818 ENCOUNTER FOR OTHER PREPROCEDURAL EXAMINATION: ICD-10-CM

## 2021-06-06 ENCOUNTER — APPOINTMENT (OUTPATIENT)
Dept: DISASTER EMERGENCY | Facility: CLINIC | Age: 36
End: 2021-06-06

## 2021-06-07 LAB — SARS-COV-2 N GENE NPH QL NAA+PROBE: NOT DETECTED

## 2021-06-09 NOTE — CONSULT LETTER
[Dear  ___] : Dear  [unfilled], [Courtesy Letter:] : I had the pleasure of seeing your patient, [unfilled], in my office today. [DrWilton  ___] : Dr. COSTA

## 2021-06-09 NOTE — PHYSICAL EXAM
[Normal Appearance] : normal appearance [General Appearance - Well Developed] : well developed [Well Groomed] : well groomed [General Appearance - Well Nourished] : well nourished [No Deformities] : no deformities [General Appearance - In No Acute Distress] : no acute distress [Normal Conjunctiva] : the conjunctiva exhibited no abnormalities [Eyelids - No Xanthelasma] : the eyelids demonstrated no xanthelasmas [III] : III [Neck Appearance] : the appearance of the neck was normal [Neck Cervical Mass (___cm)] : no neck mass was observed [Jugular Venous Distention Increased] : there was no jugular-venous distention [Thyroid Diffuse Enlargement] : the thyroid was not enlarged [Thyroid Nodule] : there were no palpable thyroid nodules [Heart Sounds] : normal S1 and S2 [Heart Rate And Rhythm] : heart rate and rhythm were normal [Murmurs] : no murmurs present [Respiration, Rhythm And Depth] : normal respiratory rhythm and effort [Exaggerated Use Of Accessory Muscles For Inspiration] : no accessory muscle use [Auscultation Breath Sounds / Voice Sounds] : lungs were clear to auscultation bilaterally [Tenderness: ____] : tenderness [unfilled] [Abdomen Soft] : soft [Abdomen Tenderness] : non-tender [Abdomen Mass (___ Cm)] : no abdominal mass palpated [Abnormal Walk] : normal gait [Gait - Sufficient For Exercise Testing] : the gait was sufficient for exercise testing [Nail Clubbing] : no clubbing of the fingernails [Cyanosis, Localized] : no localized cyanosis [Petechial Hemorrhages (___cm)] : no petechial hemorrhages [Deep Tendon Reflexes (DTR)] : deep tendon reflexes were 2+ and symmetric [Sensation] : the sensory exam was normal to light touch and pinprick [No Focal Deficits] : no focal deficits [Oriented To Time, Place, And Person] : oriented to person, place, and time [Impaired Insight] : insight and judgment were intact [Affect] : the affect was normal [Skin Color & Pigmentation] : normal skin color and pigmentation [Skin Turgor] : normal skin turgor [] : no rash

## 2021-06-10 ENCOUNTER — LABORATORY RESULT (OUTPATIENT)
Age: 36
End: 2021-06-10

## 2021-06-10 ENCOUNTER — APPOINTMENT (OUTPATIENT)
Dept: PULMONOLOGY | Facility: CLINIC | Age: 36
End: 2021-06-10
Payer: COMMERCIAL

## 2021-06-10 VITALS — OXYGEN SATURATION: 100 % | HEART RATE: 72 BPM | WEIGHT: 160 LBS | BODY MASS INDEX: 25.82 KG/M2 | TEMPERATURE: 97.9 F

## 2021-06-10 DIAGNOSIS — E55.9 VITAMIN D DEFICIENCY, UNSPECIFIED: ICD-10-CM

## 2021-06-10 PROCEDURE — 99215 OFFICE O/P EST HI 40 MIN: CPT | Mod: 25

## 2021-06-10 PROCEDURE — 36415 COLL VENOUS BLD VENIPUNCTURE: CPT

## 2021-06-10 PROCEDURE — 94729 DIFFUSING CAPACITY: CPT

## 2021-06-10 PROCEDURE — 94726 PLETHYSMOGRAPHY LUNG VOLUMES: CPT

## 2021-06-10 PROCEDURE — ZZZZZ: CPT

## 2021-06-10 PROCEDURE — 94060 EVALUATION OF WHEEZING: CPT

## 2021-06-10 RX ORDER — ERGOCALCIFEROL 1.25 MG/1
1.25 MG CAPSULE ORAL
Qty: 4 | Refills: 2 | Status: DISCONTINUED | COMMUNITY
Start: 2019-05-20 | End: 2021-06-10

## 2021-06-10 RX ORDER — PREDNISONE 10 MG/1
10 TABLET ORAL
Refills: 0 | Status: DISCONTINUED | COMMUNITY
Start: 2019-08-27 | End: 2021-06-10

## 2021-06-10 NOTE — HISTORY OF PRESENT ILLNESS
[TextBox_4] : This letter  is regarding your patient  who  attended pulmonary out patient office today.  I have reviewed  patient's  past history, social history, family history and medication list. I also  reviewed nurse practitioners/ and fellows  notes and assessment and agree with it.  \par The patient was referred by Dr.Aliama Okeefe for sarcoidosis (previously diagnosed on CT-- no biopsy) . PMH Gray's Palsy. Former cigarette smoker approx age [age]. 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 Pt c/o exertional dyspnea\par \par ------No history of , fever, chills , rigors, chest pain, or hemoptysis. Questionable history of Raynaud's phenomenon. No h/o significant weight loss in last few months. No history of liver dysfunction , collagen vascular disorder or chronic thromboembolic disease. I would classify the patient's dyspnea as WHO  FUNCTIONAL CLASS II--------\par \par ----Echo  date--not done----\par --CARDIAC MRI-   OCT 2019---NO EVIDENCE OF CARDIAC  SARCOIDOSIS \par ----Pft date---5/2019 mild obstruction w/minimal bronchodilator response, decreased dlco------\par \par PFT 2021=====mild obstruction w/minimal bronchodilator response, decreased dlco------\par \par ----Ct scan date---2019--  AIRWAYS AND LUNGS: The central tracheobronchial tree is patent. Extensive \par bilateral perilymphatic nodules and irregular opacities most extensive in \par both upper lobes with associated upper lobe volume loss. Mild bronchiectasis \par is noted throughout the lungs. \par \par MEDIASTINUM AND PLEURA: Subcarinal lymph node measures 2.5 cm. Other smaller \par mediastinal lymph nodes are noted. The visualized portion of the thyroid \par gland is unremarkable. There is no pleural effusion. There is no \par pneumothorax. \par \par HEART AND VESSELS: The heart is normal in size. There are no \par atherosclerotic calcifications of the aorta. There is no pericardial \par effusion. \par \par UPPER ABDOMEN: Images of the upper abdomen demonstrate no abnormality. \par \par BONES AND SOFT TISSUES: The bones are unremarkable. The soft tissues are \par unremarkable. \par \par TUBES/LINES: None. \par \par IMPRESSION: \par Extensive pulmonary sarcoidosis. \par \par CT SCAN OCT 2019 \par IMPRESSION: \par \par Mild improvement in bilateral upper lobe irregular opacities. Otherwise no \par significant change in bilateral perilymphatic nodules and distribution of \par upper lobe predominant opacities along with mild bronchiectasis compatible \par with history of pulmonary sarcoidosis. No honeycombing. \par \par Interval decrease in size of subcarinal lymph node now measuring 1.9 x 1.7 \par cm, previously 3.1 x 2.6 cm. Otherwise no significant change in \par subcentimeter mediastinal lymph nodes. \par \par  \par ct chest 10/2019 IMPRESSION: \par Since 6/6/2019, \par Mild improvement in bilateral upper lobe irregular opacities. Otherwise no \par significant change in bilateral perilymphatic nodules and distribution of \par upper lobe predominant opacities along with mild bronchiectasis compatible \par with history of pulmonary sarcoidosis. No honeycombing. \par \par Interval decrease in size of subcarinal lymph node now measuring 1.9 x 1.7 \par cm, previously 3.1 x 2.6 cm. Otherwise no significant change in \par subcentimeter mediastinal lymph nodes. \par \par BRONCHOSCOPYCytopathology \par \par Addendum\par AFB and GMS stains are negative for Acid Fast Bacilli and Fungal\par Forms micro organisms.\par \par \par Cytopathology Report ACCESSION No:  [id number]\par \par Final Diagnosis\par LYMPH NODE, LEVEL 7, EBUS GUIDED FNA\par SIGNIFICANT FINDINGS.\par Non-necrotizing granulomatous inflammation.\par \par \par OCTOBER 2019 s/p bronch w/Dr Jackson c/w sarcoidosis - on prednisone 10mg x few weeks- feels that it has helped with his breathing. 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

## 2021-06-10 NOTE — DISCUSSION/SUMMARY
[FreeTextEntry1] : ---Assessment plan----------The patient has been referred here for further opinion regarding pulmonary problem, 32 yo w/sarcoidosis , PAST H/O RETINITIS, ASLO   BELLS  PALSY   bronch c/w  sarcoid  [2019]        { ?NEURO SARCOIDOSIS]   ------ no pulmonary symptoms\par 1) needs CPET\par 2) off   prednisone \par 3) repeat ct chest 1\par 4) will get cardiac MRI to r/o cardiac sarcoidosis , ?PET SCAN TO EVALUATE SARCOIDOSIS \par 5) yearly eye exam\par 6) get skin survey\par 7) f/u in 10/2021 \par 8) DOES NOT WANT TO TAKE ANY MEDICINE FOR SARCOIDOSSI FOR NOW --WILL OBSERVE OFF MEDS--- c\par 9) strongly advised to wear mask at work [employer] when exposed to dust\par 10) labs drawn today\par \par Thanks for allowing  me to participate  in the care of this patient.  Patient at this time  will follow  the above mentioned recommendations and return back for follow up visit. If you have any questions  I can be reached  at #[phone] (beeper)  or  [phone] (office).\par \par Lilo Sanchez MD, FCCP \par Director, Pulmonary Hypertension Program \par [facility] \par Division of Pulmonary, Critical Care and Sleep Medicine \par  Professor of Medicine \par [employer]\par

## 2021-06-14 ENCOUNTER — TRANSCRIPTION ENCOUNTER (OUTPATIENT)
Age: 36
End: 2021-06-14

## 2021-06-14 LAB
25(OH)D3 SERPL-MCNC: 21.1 NG/ML
ACE BLD-CCNC: 151 U/L
ALBUMIN SERPL ELPH-MCNC: 4.3 G/DL
ALP BLD-CCNC: 85 U/L
ALT SERPL-CCNC: 22 U/L
ANION GAP SERPL CALC-SCNC: 14 MMOL/L
AST SERPL-CCNC: 25 U/L
BASOPHILS # BLD AUTO: 0.03 K/UL
BASOPHILS NFR BLD AUTO: 0.8 %
BILIRUB SERPL-MCNC: 0.8 MG/DL
BUN SERPL-MCNC: 13 MG/DL
CALCIUM SERPL-MCNC: 9.5 MG/DL
CHLORIDE SERPL-SCNC: 102 MMOL/L
CO2 SERPL-SCNC: 20 MMOL/L
COVID-19 NUCLEOCAPSID  GAM ANTIBODY INTERPRETATION: NEGATIVE
CREAT SERPL-MCNC: 0.88 MG/DL
DEPRECATED KAPPA LC FREE/LAMBDA SER: 0.89 RATIO
EOSINOPHIL # BLD AUTO: 0.19 K/UL
EOSINOPHIL NFR BLD AUTO: 5.3 %
GLUCOSE SERPL-MCNC: 109 MG/DL
HCT VFR BLD CALC: 45.3 %
HGB BLD-MCNC: 14.3 G/DL
IGA SER QL IEP: 246 MG/DL
IGE SER-MCNC: 6 KU/L
IGG SER QL IEP: 2316 MG/DL
IGM SER QL IEP: 104 MG/DL
IMM GRANULOCYTES NFR BLD AUTO: 0.3 %
KAPPA LC CSF-MCNC: 2.19 MG/DL
KAPPA LC SERPL-MCNC: 1.96 MG/DL
LYMPHOCYTES # BLD AUTO: 1.17 K/UL
LYMPHOCYTES NFR BLD AUTO: 32.7 %
MAN DIFF?: NORMAL
MCHC RBC-ENTMCNC: 25.9 PG
MCHC RBC-ENTMCNC: 31.6 GM/DL
MCV RBC AUTO: 82.1 FL
MONOCYTES # BLD AUTO: 0.67 K/UL
MONOCYTES NFR BLD AUTO: 18.7 %
NEUTROPHILS # BLD AUTO: 1.51 K/UL
NEUTROPHILS NFR BLD AUTO: 42.2 %
PLATELET # BLD AUTO: 325 K/UL
POTASSIUM SERPL-SCNC: 4.4 MMOL/L
PROT SERPL-MCNC: 8 G/DL
RBC # BLD: 5.52 M/UL
RBC # FLD: 14.1 %
SARS-COV-2 AB SERPL QL IA: 0.1 INDEX
SODIUM SERPL-SCNC: 136 MMOL/L
WBC # FLD AUTO: 3.58 K/UL

## 2021-06-15 ENCOUNTER — NON-APPOINTMENT (OUTPATIENT)
Age: 36
End: 2021-06-15

## 2021-06-15 ENCOUNTER — TRANSCRIPTION ENCOUNTER (OUTPATIENT)
Age: 36
End: 2021-06-15

## 2021-08-07 ENCOUNTER — APPOINTMENT (OUTPATIENT)
Dept: DISASTER EMERGENCY | Facility: CLINIC | Age: 36
End: 2021-08-07

## 2021-09-26 ENCOUNTER — APPOINTMENT (OUTPATIENT)
Dept: CT IMAGING | Facility: IMAGING CENTER | Age: 36
End: 2021-09-26
Payer: COMMERCIAL

## 2021-09-26 ENCOUNTER — OUTPATIENT (OUTPATIENT)
Dept: OUTPATIENT SERVICES | Facility: HOSPITAL | Age: 36
LOS: 1 days | End: 2021-09-26
Payer: COMMERCIAL

## 2021-09-26 DIAGNOSIS — Z98.890 OTHER SPECIFIED POSTPROCEDURAL STATES: Chronic | ICD-10-CM

## 2021-09-26 DIAGNOSIS — Z00.8 ENCOUNTER FOR OTHER GENERAL EXAMINATION: ICD-10-CM

## 2021-09-26 DIAGNOSIS — D86.9 SARCOIDOSIS, UNSPECIFIED: ICD-10-CM

## 2021-09-26 PROCEDURE — 71250 CT THORAX DX C-: CPT | Mod: 26

## 2021-09-26 PROCEDURE — 71250 CT THORAX DX C-: CPT

## 2021-09-27 ENCOUNTER — TRANSCRIPTION ENCOUNTER (OUTPATIENT)
Age: 36
End: 2021-09-27

## 2021-10-15 ENCOUNTER — LABORATORY RESULT (OUTPATIENT)
Age: 36
End: 2021-10-15

## 2021-10-15 ENCOUNTER — APPOINTMENT (OUTPATIENT)
Dept: DISASTER EMERGENCY | Facility: CLINIC | Age: 36
End: 2021-10-15

## 2021-10-15 ENCOUNTER — APPOINTMENT (OUTPATIENT)
Dept: CARDIOLOGY | Facility: CLINIC | Age: 36
End: 2021-10-15
Payer: COMMERCIAL

## 2021-10-15 PROCEDURE — 93306 TTE W/DOPPLER COMPLETE: CPT

## 2021-10-19 ENCOUNTER — APPOINTMENT (OUTPATIENT)
Dept: PULMONOLOGY | Facility: CLINIC | Age: 36
End: 2021-10-19
Payer: COMMERCIAL

## 2021-10-19 PROCEDURE — 94621 CARDIOPULM EXERCISE TESTING: CPT

## 2021-10-19 PROCEDURE — 94375 RESPIRATORY FLOW VOLUME LOOP: CPT

## 2021-10-25 ENCOUNTER — APPOINTMENT (OUTPATIENT)
Dept: PULMONOLOGY | Facility: CLINIC | Age: 36
End: 2021-10-25
Payer: COMMERCIAL

## 2021-10-25 VITALS
SYSTOLIC BLOOD PRESSURE: 118 MMHG | HEIGHT: 60 IN | DIASTOLIC BLOOD PRESSURE: 80 MMHG | RESPIRATION RATE: 16 BRPM | WEIGHT: 168 LBS | TEMPERATURE: 98 F | BODY MASS INDEX: 32.98 KG/M2 | HEART RATE: 91 BPM

## 2021-10-25 DIAGNOSIS — D86.9 SARCOIDOSIS, UNSPECIFIED: ICD-10-CM

## 2021-10-25 PROCEDURE — 99215 OFFICE O/P EST HI 40 MIN: CPT | Mod: 25

## 2021-10-25 PROCEDURE — 36415 COLL VENOUS BLD VENIPUNCTURE: CPT

## 2021-10-25 RX ORDER — BUDESONIDE AND FORMOTEROL FUMARATE DIHYDRATE 80; 4.5 UG/1; UG/1
80-4.5 AEROSOL RESPIRATORY (INHALATION) TWICE DAILY
Qty: 1 | Refills: 1 | Status: ACTIVE | COMMUNITY
Start: 2021-10-25 | End: 1900-01-01

## 2021-10-25 NOTE — HISTORY OF PRESENT ILLNESS
[TextBox_4] : This letter  is regarding your patient  who  attended pulmonary out patient office today.  I have reviewed  patient's  past history, social history, family history and medication list. I also  reviewed nurse practitioners/ and fellows  notes and assessment and agree with it.  \par The patient was referred by Dr.Aliama Okeefe for sarcoidosis (previously diagnosed on CT-- no biopsy) . PMH Gray's Palsy. Former cigarette smoker approx age [age]. 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 Pt c/o exertional dyspnea\par \par ------No history of , fever, chills , rigors, chest pain, or hemoptysis. Questionable history of Raynaud's phenomenon. No h/o significant weight loss in last few months. No history of liver dysfunction , collagen vascular disorder or chronic thromboembolic disease. I would classify the patient's dyspnea as WHO  FUNCTIONAL CLASS II--------\par \par ----Echo  date--not done----\par --CARDIAC MRI-   OCT 2019---NO EVIDENCE OF CARDIAC  SARCOIDOSIS \par ----Pft date---5/2019 mild obstruction w/minimal bronchodilator response, decreased dlco------\par \par PFT 2021=====mild obstruction w/minimal bronchodilator response, decreased dlco------\par CPET 2021------- evidence of desaturation on exercise\par ----Ct scan date---2019--  AIRWAYS AND LUNGS: The central tracheobronchial tree is patent. Extensive \par bilateral perilymphatic nodules and irregular opacities most extensive in \par both upper lobes with associated upper lobe volume loss. Mild bronchiectasis \par is noted throughout the lungs. \par \par MEDIASTINUM AND PLEURA: Subcarinal lymph node measures 2.5 cm. Other smaller \par mediastinal lymph nodes are noted. The visualized portion of the thyroid \par gland is unremarkable. There is no pleural effusion. There is no \par pneumothorax. \par \par HEART AND VESSELS: The heart is normal in size. There are no \par atherosclerotic calcifications of the aorta. There is no pericardial \par effusion. \par \par UPPER ABDOMEN: Images of the upper abdomen demonstrate no abnormality. \par \par BONES AND SOFT TISSUES: The bones are unremarkable. The soft tissues are \par unremarkable. \par \par TUBES/LINES: None. \par \par IMPRESSION: \par Extensive pulmonary sarcoidosis. \par \par ct chest 9/2021 \par IMPRESSION:\par Stable sarcoidosis with diffuse perilymphatic nodules and mild fibrosis in the upper lobes.\par \par CT SCAN OCT 2019 \par IMPRESSION: \par \par Mild improvement in bilateral upper lobe irregular opacities. Otherwise no \par significant change in bilateral perilymphatic nodules and distribution of \par upper lobe predominant opacities along with mild bronchiectasis compatible \par with history of pulmonary sarcoidosis. No honeycombing. \par \par Interval decrease in size of subcarinal lymph node now measuring 1.9 x 1.7 \par cm, previously 3.1 x 2.6 cm. Otherwise no significant change in \par subcentimeter mediastinal lymph nodes. \par \par  \par ct chest 10/2019 IMPRESSION: \par Since 6/6/2019, \par Mild improvement in bilateral upper lobe irregular opacities. Otherwise no \par significant change in bilateral perilymphatic nodules and distribution of \par upper lobe predominant opacities along with mild bronchiectasis compatible \par with history of pulmonary sarcoidosis. No honeycombing. \par \par Interval decrease in size of subcarinal lymph node now measuring 1.9 x 1.7 \par cm, previously 3.1 x 2.6 cm. Otherwise no significant change in \par subcentimeter mediastinal lymph nodes. \par \par BRONCHOSCOPYCytopathology \par \par Addendum\par AFB and GMS stains are negative for Acid Fast Bacilli and Fungal\par Forms micro organisms.\par \par \par Cytopathology Report ACCESSION No:  [id number]\par \par Final Diagnosis\par LYMPH NODE, LEVEL 7, EBUS GUIDED FNA\par SIGNIFICANT FINDINGS.\par Non-necrotizing granulomatous inflammation.\par \par \par OCTOBER 2019 s/p bronch w/Dr Jackson c/w sarcoidosis - on prednisone 10mg x few weeks- feels that it has helped with his breathing. 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

## 2021-10-25 NOTE — DISCUSSION/SUMMARY
[FreeTextEntry1] : ---Assessment plan----------The patient has been referred here for further opinion regarding pulmonary problem, 34 yo w/sarcoidosis , PAST H/O RETINITIS, ASLO   BELLS  PALSY   bronch c/w  sarcoid  [2019]           { ?NEURO SARCOIDOSIS]   ------ no pulmonary symptoms\par 1)CPET shows evidence of desaturation---\par 2) had long discussion regarding treatment for sarcoidosis he does not want to take any treatment\par 3) repeat ct chest ONE YEAR \par 4) will get cardiac MRI to r/o cardiac sarcoidosis , ?PET SCAN TO EVALUATE SARCOIDOSIS \par 5) yearly eye exam\par 6) get skin survey\par 7) has features of anisa-needs home sleep testing, ess 4\par 8) DOES NOT WANT TO TAKE ANY MEDICINE FOR SARCOIDOSSI FOR NOW --WILL OBSERVE OFF MEDS--\par 9) strongly advised to wear mask at work [employer] when exposed to dust\par 10) labs drawn today\par 11) 2nd covid vac last tuesday- to get influenza vac after next week\par 12) f/u in 3 m\par \par \par Thanks for allowing  me to participate  in the care of this patient.  Patient at this time  will follow  the above mentioned recommendations and return back for follow up visit. If you have any questions  I can be reached  at #[phone] (beeper)  or  [phone] (office).\par \par Lilo Sanchez MD, FCCP \par Director, Pulmonary Hypertension Program \par [facility] \par Division of Pulmonary, Critical Care and Sleep Medicine \par  Professor of Medicine \par [employer]\par \par [employer]\par \par

## 2021-10-26 LAB
ALBUMIN SERPL ELPH-MCNC: 4.6 G/DL
ALP BLD-CCNC: 99 U/L
ALT SERPL-CCNC: 23 U/L
ANION GAP SERPL CALC-SCNC: 16 MMOL/L
AST SERPL-CCNC: 24 U/L
BASOPHILS # BLD AUTO: 0.05 K/UL
BASOPHILS NFR BLD AUTO: 1.4 %
BILIRUB SERPL-MCNC: 0.8 MG/DL
BUN SERPL-MCNC: 9 MG/DL
CALCIUM SERPL-MCNC: 10 MG/DL
CHLORIDE SERPL-SCNC: 102 MMOL/L
CO2 SERPL-SCNC: 20 MMOL/L
CREAT SERPL-MCNC: 0.85 MG/DL
CRP SERPL-MCNC: <3 MG/L
EOSINOPHIL # BLD AUTO: 0.18 K/UL
EOSINOPHIL NFR BLD AUTO: 4.9 %
GLUCOSE SERPL-MCNC: 122 MG/DL
HCT VFR BLD CALC: 47.9 %
HGB BLD-MCNC: 15.5 G/DL
IMM GRANULOCYTES NFR BLD AUTO: 0.5 %
LDH SERPL-CCNC: 210 U/L
LYMPHOCYTES # BLD AUTO: 0.7 K/UL
LYMPHOCYTES NFR BLD AUTO: 19.2 %
MAN DIFF?: NORMAL
MCHC RBC-ENTMCNC: 26.2 PG
MCHC RBC-ENTMCNC: 32.4 GM/DL
MCV RBC AUTO: 80.9 FL
MONOCYTES # BLD AUTO: 0.6 K/UL
MONOCYTES NFR BLD AUTO: 16.4 %
NEUTROPHILS # BLD AUTO: 2.1 K/UL
NEUTROPHILS NFR BLD AUTO: 57.6 %
PLATELET # BLD AUTO: 328 K/UL
POTASSIUM SERPL-SCNC: 4.3 MMOL/L
PROT SERPL-MCNC: 8.6 G/DL
RBC # BLD: 5.92 M/UL
RBC # FLD: 13.9 %
SODIUM SERPL-SCNC: 138 MMOL/L
WBC # FLD AUTO: 3.65 K/UL

## 2022-03-04 NOTE — DISCUSSION/SUMMARY
[FreeTextEntry1] : ---Assessment plan----------The patient has been referred here for further opinion regarding pulmonary problem, 34 yo w/sarcoidosis , PAST H/O RETINITIS, ASLO   BELLS  PALSY   bronch c/w  sarcoid  [2019]            { ?NEURO SARCOIDOSIS]   ------ no pulmonary symptoms\par 1)CPET shows evidence of desaturation---\par 2) had long discussion regarding treatment for sarcoidosis he does not want to take any treatment\par 3) repeat ct chest ONE YEAR \par 4) will get cardiac MRI to r/o cardiac sarcoidosis , ?PET SCAN TO EVALUATE SARCOIDOSIS \par 5) yearly eye exam\par 6) get skin survey\par 7) has features of anisa-needs home sleep testing, ess 4\par 8) DOES NOT WANT TO TAKE ANY MEDICINE FOR SARCOIDOSSI FOR NOW --WILL OBSERVE OFF MEDS--\par 9) strongly advised to wear mask at work [employer] when exposed to dust\par 10) labs drawn today\par 11) 2nd covid vac last tuesday- to get influenza vac after next week\par 12) f/u in 3 m\par \par \par Thanks for allowing  me to participate  in the care of this patient.  Patient at this time  will follow  the above mentioned recommendations and return back for follow up visit. If you have any questions  I can be reached  at #[phone] (beeper)  or  [phone] (office).\par \par Lilo Sanchez MD, FCCP \par Director, Pulmonary Hypertension Program \par [facility] \par Division of Pulmonary, Critical Care and Sleep Medicine \par  Professor of Medicine \par [employer]\par \par [employer]\par \par

## 2022-03-04 NOTE — HISTORY OF PRESENT ILLNESS
[TextBox_4] : This letter  is regarding your patient  who  attended pulmonary out patient office today.  I have reviewed  patient's  past history, social history, family history and medication list. I also  reviewed nurse practitioners/ and fellows  notes and assessment and agree with it.  \par The patient was referred by Dr.Aliama Okeefe for sarcoidosis (previously diagnosed on CT-- no biopsy) . PMH Gray's Palsy. Former cigarette smoker approx age 20-21. 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 Pt c/o exertional dyspnea\par \par ------No history of , fever, chills , rigors, chest pain, or hemoptysis. Questionable history of Raynaud's phenomenon. No h/o significant weight loss in last few months. No history of liver dysfunction , collagen vascular disorder or chronic thromboembolic disease. I would classify the patient's dyspnea as WHO  FUNCTIONAL CLASS II--------\par \par ----Echo  date--not done----\par --CARDIAC MRI-   OCT 2019---NO EVIDENCE OF CARDIAC  SARCOIDOSIS \par ----Pft date---5/2019 mild obstruction w/minimal bronchodilator response, decreased dlco------\par \par PFT 2021=====mild obstruction w/minimal bronchodilator response, decreased dlco------\par CPET 2021------- evidence of desaturation on exercise\par ----Ct scan date---2019--  AIRWAYS AND LUNGS: The central tracheobronchial tree is patent. Extensive \par bilateral perilymphatic nodules and irregular opacities most extensive in \par both upper lobes with associated upper lobe volume loss. Mild bronchiectasis \par is noted throughout the lungs. \par \par MEDIASTINUM AND PLEURA: Subcarinal lymph node measures 2.5 cm. Other smaller \par mediastinal lymph nodes are noted. The visualized portion of the thyroid \par gland is unremarkable. There is no pleural effusion. There is no \par pneumothorax. \par \par HEART AND VESSELS: The heart is normal in size. There are no \par atherosclerotic calcifications of the aorta. There is no pericardial \par effusion. \par \par UPPER ABDOMEN: Images of the upper abdomen demonstrate no abnormality. \par \par BONES AND SOFT TISSUES: The bones are unremarkable. The soft tissues are \par unremarkable. \par \par TUBES/LINES: None. \par \par IMPRESSION: \par Extensive pulmonary sarcoidosis. \par \par ct chest 9/2021 \par IMPRESSION:\par Stable sarcoidosis with diffuse perilymphatic nodules and mild fibrosis in the upper lobes.\par \par CT SCAN OCT 2019 \par IMPRESSION: \par \par Mild improvement in bilateral upper lobe irregular opacities. Otherwise no \par significant change in bilateral perilymphatic nodules and distribution of \par upper lobe predominant opacities along with mild bronchiectasis compatible \par with history of pulmonary sarcoidosis. No honeycombing. \par \par Interval decrease in size of subcarinal lymph node now measuring 1.9 x 1.7 \par cm, previously 3.1 x 2.6 cm. Otherwise no significant change in \par subcentimeter mediastinal lymph nodes. \par \par  \par ct chest 10/2019 IMPRESSION: \par Since 6/6/2019, \par Mild improvement in bilateral upper lobe irregular opacities. Otherwise no \par significant change in bilateral perilymphatic nodules and distribution of \par upper lobe predominant opacities along with mild bronchiectasis compatible \par with history of pulmonary sarcoidosis. No honeycombing. \par \par Interval decrease in size of subcarinal lymph node now measuring 1.9 x 1.7 \par cm, previously 3.1 x 2.6 cm. Otherwise no significant change in \par subcentimeter mediastinal lymph nodes. \par \par BRONCHOSCOPYCytopathology \par \par Addendum\par AFB and GMS stains are negative for Acid Fast Bacilli and Fungal\par Forms micro organisms.\par \par \par Cytopathology Report ACCESSION No:  [id number]\par \par Final Diagnosis\par LYMPH NODE, LEVEL 7, EBUS GUIDED FNA\par SIGNIFICANT FINDINGS.\par Non-necrotizing granulomatous inflammation.\par \par \par OCTOBER 2019 s/p bronch w/Dr Jackson c/w sarcoidosis - on prednisone 10mg x few weeks- feels that it has helped with his breathing. 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

## 2022-03-04 NOTE — PHYSICAL EXAM
[General Appearance - Well Developed] : well developed [Normal Appearance] : normal appearance [Well Groomed] : well groomed [General Appearance - Well Nourished] : well nourished [No Deformities] : no deformities [General Appearance - In No Acute Distress] : no acute distress [Normal Conjunctiva] : the conjunctiva exhibited no abnormalities [Eyelids - No Xanthelasma] : the eyelids demonstrated no xanthelasmas [III] : III [Neck Appearance] : the appearance of the neck was normal [Neck Cervical Mass (___cm)] : no neck mass was observed [Jugular Venous Distention Increased] : there was no jugular-venous distention [Thyroid Diffuse Enlargement] : the thyroid was not enlarged [Thyroid Nodule] : there were no palpable thyroid nodules [Heart Rate And Rhythm] : heart rate and rhythm were normal [Heart Sounds] : normal S1 and S2 [Murmurs] : no murmurs present [Respiration, Rhythm And Depth] : normal respiratory rhythm and effort [Exaggerated Use Of Accessory Muscles For Inspiration] : no accessory muscle use [Auscultation Breath Sounds / Voice Sounds] : lungs were clear to auscultation bilaterally [Tenderness: ____] : tenderness [unfilled] [Abdomen Soft] : soft [Abdomen Tenderness] : non-tender [Abdomen Mass (___ Cm)] : no abdominal mass palpated [Gait - Sufficient For Exercise Testing] : the gait was sufficient for exercise testing [Abnormal Walk] : normal gait [Nail Clubbing] : no clubbing of the fingernails [Cyanosis, Localized] : no localized cyanosis [Petechial Hemorrhages (___cm)] : no petechial hemorrhages [Deep Tendon Reflexes (DTR)] : deep tendon reflexes were 2+ and symmetric [Sensation] : the sensory exam was normal to light touch and pinprick [No Focal Deficits] : no focal deficits [Oriented To Time, Place, And Person] : oriented to person, place, and time [Impaired Insight] : insight and judgment were intact [Affect] : the affect was normal [Skin Color & Pigmentation] : normal skin color and pigmentation [Skin Turgor] : normal skin turgor [] : no rash

## 2022-03-07 ENCOUNTER — APPOINTMENT (OUTPATIENT)
Dept: PULMONOLOGY | Facility: CLINIC | Age: 37
End: 2022-03-07

## 2022-03-15 ENCOUNTER — APPOINTMENT (OUTPATIENT)
Dept: MRI IMAGING | Facility: CLINIC | Age: 37
End: 2022-03-15
Payer: COMMERCIAL

## 2022-03-15 ENCOUNTER — OUTPATIENT (OUTPATIENT)
Dept: OUTPATIENT SERVICES | Facility: HOSPITAL | Age: 37
LOS: 1 days | End: 2022-03-15
Payer: COMMERCIAL

## 2022-03-15 DIAGNOSIS — Z00.8 ENCOUNTER FOR OTHER GENERAL EXAMINATION: ICD-10-CM

## 2022-03-15 DIAGNOSIS — D86.9 SARCOIDOSIS, UNSPECIFIED: ICD-10-CM

## 2022-03-15 DIAGNOSIS — Z98.890 OTHER SPECIFIED POSTPROCEDURAL STATES: Chronic | ICD-10-CM

## 2022-03-15 PROCEDURE — A9585: CPT

## 2022-03-15 PROCEDURE — 75561 CARDIAC MRI FOR MORPH W/DYE: CPT

## 2022-03-15 PROCEDURE — 75561 CARDIAC MRI FOR MORPH W/DYE: CPT | Mod: 26

## 2022-05-17 ENCOUNTER — FORM ENCOUNTER (OUTPATIENT)
Age: 37
End: 2022-05-17

## 2022-05-18 ENCOUNTER — OUTPATIENT (OUTPATIENT)
Dept: OUTPATIENT SERVICES | Facility: HOSPITAL | Age: 37
LOS: 1 days | End: 2022-05-18
Payer: COMMERCIAL

## 2022-05-18 ENCOUNTER — APPOINTMENT (OUTPATIENT)
Dept: SLEEP CENTER | Facility: CLINIC | Age: 37
End: 2022-05-18
Payer: COMMERCIAL

## 2022-05-18 DIAGNOSIS — Z98.890 OTHER SPECIFIED POSTPROCEDURAL STATES: Chronic | ICD-10-CM

## 2022-05-18 PROCEDURE — 95806 SLEEP STUDY UNATT&RESP EFFT: CPT | Mod: 26

## 2022-06-23 DIAGNOSIS — G47.33 OBSTRUCTIVE SLEEP APNEA (ADULT) (PEDIATRIC): ICD-10-CM

## 2022-10-14 NOTE — ASSESSMENT
[FreeTextEntry1] : 33 year old male, evaluation of subcarinal lymphadenopathy, ref: Dr. Sanchez.  History of sarcoid (diagnosed by CT). \par \par CT Chest on 6/6/19 revealed:\par - Subcarinal lymph node, 2.5 cm\par - Extensive bilateral perilymphatic nodules and irregular opacities most extensive in bilateral upper lobes\par - Mild bronchiectasis bilaterally\par \par I have reviewed the patient's medical records and diagnostic images during the time of this office visit, and I have made the following recommendation:\par 1.  Flexible Bronchoscopy, Endobronchial Ultrasound, Possible Mediastinoscopy\par 2.  The risks, benefits, and alternatives to the procedure were discussed with the patient at length. He verbalized understanding and is in agreement with the above treatment plan. \par \par \par Written by Lucero Zamora NP, acting as a scribe for Lennox Quinonez MD.\par \par The documentation recorded by the scribe accurately reflects the service I personally performed and the decisions made by me. LENNOX QUINONEZ MD
None

## 2023-08-12 NOTE — REASON FOR VISIT
Haldol 5mg PO/IM, Lorazepam 2mg PO/IM, Benadryl 50mg PO/IM q6h PRN for agitation not responsive to verbal redirection. Hold haldol for QTc above 500ms.
[Consultation] : a consultation visit

## 2024-04-05 ENCOUNTER — APPOINTMENT (OUTPATIENT)
Dept: ORTHOPEDIC SURGERY | Facility: CLINIC | Age: 39
End: 2024-04-05

## 2025-05-06 DIAGNOSIS — D86.9 SARCOIDOSIS, UNSPECIFIED: ICD-10-CM
